# Patient Record
Sex: MALE | Race: WHITE | Employment: STUDENT | URBAN - METROPOLITAN AREA
[De-identification: names, ages, dates, MRNs, and addresses within clinical notes are randomized per-mention and may not be internally consistent; named-entity substitution may affect disease eponyms.]

---

## 2017-01-11 ENCOUNTER — TELEPHONE (OUTPATIENT)
Dept: CARDIOLOGY CLINIC | Age: 27
End: 2017-01-11

## 2017-04-20 ENCOUNTER — OFFICE VISIT (OUTPATIENT)
Dept: INTERNAL MEDICINE CLINIC | Age: 27
End: 2017-04-20

## 2017-04-20 VITALS
HEART RATE: 100 BPM | SYSTOLIC BLOOD PRESSURE: 144 MMHG | RESPIRATION RATE: 16 BRPM | DIASTOLIC BLOOD PRESSURE: 94 MMHG | OXYGEN SATURATION: 97 % | HEIGHT: 72 IN | TEMPERATURE: 99.5 F | BODY MASS INDEX: 25.14 KG/M2 | WEIGHT: 185.6 LBS

## 2017-04-20 DIAGNOSIS — J11.1 INFLUENZA: Primary | ICD-10-CM

## 2017-04-20 RX ORDER — OSELTAMIVIR PHOSPHATE 75 MG/1
75 CAPSULE ORAL 2 TIMES DAILY
Qty: 10 CAP | Refills: 0 | Status: SHIPPED | OUTPATIENT
Start: 2017-04-20 | End: 2017-04-25

## 2017-04-20 NOTE — MR AVS SNAPSHOT
Visit Information Date & Time Provider Department Dept. Phone Encounter #  
 4/20/2017  3:15 PM Dimas Hernandez MD [address] [phone] [id number] Follow-up Instructions Return if symptoms worsen or fail to improve. Upcoming Health Maintenance Date Due DTaP/Tdap/Td series (2 - Td) 10/16/2026 Allergies as of 4/20/2017  Review Complete On: 4/20/2017 By: Britt Lawson MD  
  
 Severity Noted Reaction Type Reactions Sulfa (Sulfonamide Antibiotics)  10/21/2016    Swelling Current Immunizations  Reviewed on 11/28/2016 Name Date  
 TB Skin Test (PPD) Intradermal 11/28/2016 11:59 AM  
  
 Not reviewed this visit You Were Diagnosed With   
  
 Codes Comments Influenza    -  Primary ICD-10-CM: J11.1 ICD-9-CM: 487. 1 Vitals BP Pulse Temp Resp Height(growth percentile) Weight(growth percentile) (!) 144/94 (BP 1 Location: Right arm, BP Patient Position: Sitting) (!) 115 99.5 °F (37.5 °C) (Oral) 16 6' (1.829 m) 185 lb 9.6 oz (84.2 kg) SpO2 BMI Smoking Status 97% 25.17 kg/m2 Never Smoker Vitals History BMI and BSA Data Body Mass Index Body Surface Area  
 25.17 kg/m 2 2.07 m 2 Preferred Pharmacy Pharmacy Name Phone [facility] [address] [phone] Your Updated Medication List  
  
   
This list is accurate as of: 4/20/17  3:50 PM.  Always use your most recent med list.  
  
  
  
  
 finasteride 1 mg tablet Commonly known as:  Osmin To Take 1 Tab by mouth daily. hydroCHLOROthiazide 12.5 mg tablet Commonly known as:  HYDRODIURIL Take 1 Tab by mouth daily. oseltamivir 75 mg capsule Commonly known as:  TAMIFLU Take 1 Cap by mouth two (2) times a day for 5 days. Prescriptions Sent to Pharmacy  Refills  
 oseltamivir (TAMIFLU) 75 mg capsule 0  
 Sig: Take 1 Cap by mouth two (2) times a day for 5 days. Class: Normal  
 [facility] [place] [address] #: [phone] Route: Oral  
  
Follow-up Instructions Return if symptoms worsen or fail to improve. Patient Instructions 1) follow-up as needed or sooner if worsening symptoms. 2) keep hydrated with fluids 3) take tylenol for fever no more than 4000mg a day Influenza (Flu): Care Instructions Your Care Instructions Influenza (flu) is an infection in the lungs and breathing passages. It is caused by the influenza virus. There are different strains, or types, of the flu virus from year to year. Unlike the common cold, the flu comes on suddenly and the symptoms, such as a cough, congestion, fever, chills, fatigue, aches, and pains, are more severe. These symptoms may last up to 10 days. Although the flu can make you feel very sick, it usually doesn't cause serious health problems. Home treatment is usually all you need for flu symptoms. But your doctor may prescribe antiviral medicine to prevent other health problems, such as pneumonia, from developing. Older people and those who have a long-term health condition, such as lung disease, are most at risk for having pneumonia or other health problems. Follow-up care is a key part of your treatment and safety. Be sure to make and go to all appointments, and call your doctor if you are having problems. Its also a good idea to know your test results and keep a list of the medicines you take. How can you care for yourself at home? · Get plenty of rest. 
· Drink plenty of fluids, enough so that your urine is light yellow or clear like water. If you have kidney, heart, or liver disease and have to limit fluids, talk with your doctor before you increase the amount of fluids you drink.  
· Take an over-the-counter pain medicine if needed, such as acetaminophen (Tylenol), ibuprofen (Advil, Motrin), or naproxen (Aleve), to relieve fever, headache, and muscle aches. Read and follow all instructions on the label. No one younger than 20 should take aspirin. It has been linked to Reye syndrome, a serious illness. · Do not smoke. Smoking can make the flu worse. If you need help quitting, talk to your doctor about stop-smoking programs and medicines. These can increase your chances of quitting for good. · Breathe moist air from a hot shower or from a sink filled with hot water to help clear a stuffy nose. · Before you use cough and cold medicines, check the label. These medicines may not be safe for young children or for people with certain health problems. · If the skin around your nose and lips becomes sore, put some petroleum jelly on the area. · To ease coughing: ¨ Drink fluids to soothe a scratchy throat. ¨ Suck on cough drops or plain hard candy. ¨ Take an over-the-counter cough medicine that contains dextromethorphan to help you get some sleep. Read and follow all instructions on the label. ¨ Raise your head at night with an extra pillow. This may help you rest if coughing keeps you awake. · Take any prescribed medicine exactly as directed. Call your doctor if you think you are having a problem with your medicine. To avoid spreading the flu · Wash your hands regularly, and keep your hands away from your face. · Stay home from school, work, and other public places until you are feeling better and your fever has been gone for at least 24 hours. The fever needs to have gone away on its own without the help of medicine. · Ask people living with you to talk to their doctors about preventing the flu. They may get antiviral medicine to keep from getting the flu from you. · To prevent the flu in the future, get a flu vaccine every fall. Encourage people living with you to get the vaccine. · Cover your mouth when you cough or sneeze. When should you call for help? Call 911 anytime you think you may need emergency care. For example, call if: 
· You have severe trouble breathing. Call your doctor now or seek immediate medical care if: 
· You have new or worse trouble breathing. · You seem to be getting much sicker. · You feel very sleepy or confused. · You have a new or higher fever. · You get a new rash. Watch closely for changes in your health, and be sure to contact your doctor if: 
· You begin to get better and then get worse. · You are not getting better after 1 week. Where can you learn more? Go to http://julio c-yoanna.info/. Enter E284 in the search box to learn more about \"Influenza (Flu): Care Instructions. \" Current as of: May 23, 2016 Content Version: 11.2 © 7155-5800 [employer]. Care instructions adapted under license by [employer] (which disclaims liability or warranty for this information). If you have questions about a medical condition or this instruction, always ask your healthcare professional. [address] any warranty or liability for your use of this information. Introducing [facility]! [employer] introduces [employer] patient portal. Now you can access parts of your medical record, email your doctor's office, and request medication refills online. 1. In your internet browser, go to [email or web address] 2. Click on the First Time User? Click Here link in the Sign In box. You will see the New Member Sign Up page. 3. Enter your [employer] Access Code exactly as it appears below. You will not need to use this code after youve completed the sign-up process. If you do not sign up before the expiration date, you must request a new code. · [employer] Access Code: [email or web address] Expires: 7/19/2017  3:50 PM 
 
4. Enter the last four digits of your Social Security Number (xxxx) and Date of Birth (mm/dd/yyyy) as indicated and click Submit.  You will be taken to the next sign-up page. 5. Create a shopp ID. This will be your shopp login ID and cannot be changed, so think of one that is secure and easy to remember. 6. Create a shopp password. You can change your password at any time. 7. Enter your Password Reset Question and Answer. This can be used at a later time if you forget your password. 8. Enter your e-mail address. You will receive e-mail notification when new information is available in [address]. 9. Click Sign Up. You can now view and download portions of your medical record. 10. Click the Download Summary menu link to download a portable copy of your medical information. If you have questions, please visit the Frequently Asked Questions section of the shopp website. Remember, shopp is NOT to be used for urgent needs. For medical emergencies, dial 911. Now available from your iPhone and Android! Please provide this summary of care documentation to your next provider. Your primary care clinician is listed as Ortiz Moseley. If you have any questions after today's visit, please call [phone].

## 2017-04-20 NOTE — PATIENT INSTRUCTIONS
1) follow-up as needed or sooner if worsening symptoms. 2) keep hydrated with fluids     3) take tylenol for fever no more than 4000mg a day         Influenza (Flu): Care Instructions  Your Care Instructions  Influenza (flu) is an infection in the lungs and breathing passages. It is caused by the influenza virus. There are different strains, or types, of the flu virus from year to year. Unlike the common cold, the flu comes on suddenly and the symptoms, such as a cough, congestion, fever, chills, fatigue, aches, and pains, are more severe. These symptoms may last up to 10 days. Although the flu can make you feel very sick, it usually doesn't cause serious health problems. Home treatment is usually all you need for flu symptoms. But your doctor may prescribe antiviral medicine to prevent other health problems, such as pneumonia, from developing. Older people and those who have a long-term health condition, such as lung disease, are most at risk for having pneumonia or other health problems. Follow-up care is a key part of your treatment and safety. Be sure to make and go to all appointments, and call your doctor if you are having problems. Its also a good idea to know your test results and keep a list of the medicines you take. How can you care for yourself at home? · Get plenty of rest.  · Drink plenty of fluids, enough so that your urine is light yellow or clear like water. If you have kidney, heart, or liver disease and have to limit fluids, talk with your doctor before you increase the amount of fluids you drink. · Take an over-the-counter pain medicine if needed, such as acetaminophen (Tylenol), ibuprofen (Advil, Motrin), or naproxen (Aleve), to relieve fever, headache, and muscle aches. Read and follow all instructions on the label. No one younger than 20 should take aspirin. It has been linked to Reye syndrome, a serious illness. · Do not smoke. Smoking can make the flu worse.  If you need help quitting, talk to your doctor about stop-smoking programs and medicines. These can increase your chances of quitting for good. · Breathe moist air from a hot shower or from a sink filled with hot water to help clear a stuffy nose. · Before you use cough and cold medicines, check the label. These medicines may not be safe for young children or for people with certain health problems. · If the skin around your nose and lips becomes sore, put some petroleum jelly on the area. · To ease coughing:  ¨ Drink fluids to soothe a scratchy throat. ¨ Suck on cough drops or plain hard candy. ¨ Take an over-the-counter cough medicine that contains dextromethorphan to help you get some sleep. Read and follow all instructions on the label. ¨ Raise your head at night with an extra pillow. This may help you rest if coughing keeps you awake. · Take any prescribed medicine exactly as directed. Call your doctor if you think you are having a problem with your medicine. To avoid spreading the flu  · Wash your hands regularly, and keep your hands away from your face. · Stay home from school, work, and other public places until you are feeling better and your fever has been gone for at least 24 hours. The fever needs to have gone away on its own without the help of medicine. · Ask people living with you to talk to their doctors about preventing the flu. They may get antiviral medicine to keep from getting the flu from you. · To prevent the flu in the future, get a flu vaccine every fall. Encourage people living with you to get the vaccine. · Cover your mouth when you cough or sneeze. When should you call for help? Call 911 anytime you think you may need emergency care. For example, call if:  · You have severe trouble breathing. Call your doctor now or seek immediate medical care if:  · You have new or worse trouble breathing. · You seem to be getting much sicker. · You feel very sleepy or confused.   · You have a new or higher fever. · You get a new rash. Watch closely for changes in your health, and be sure to contact your doctor if:  · You begin to get better and then get worse. · You are not getting better after 1 week. Where can you learn more? Go to http://julio c-yoanna.info/. Enter T300 in the search box to learn more about \"Influenza (Flu): Care Instructions. \"  Current as of: May 23, 2016  Content Version: 11.2  © 4538-2362 [employer]. Care instructions adapted under license by [employer] (which disclaims liability or warranty for this information). If you have questions about a medical condition or this instruction, always ask your healthcare professional. [address] any warranty or liability for your use of this information.

## 2017-04-20 NOTE — PROGRESS NOTES
ROOM # 2    Esperanza Mensah presents today for   Chief Complaint   Patient presents with    Fever     t max 102 @ 0600    Chills     x2 days    Generalized Body Aches     x2 days       Esperanza Mensah preferred language for health care discussion is english/other. Is someone accompanying this pt? no    Is the patient using any DME equipment during OV? no    Depression Screening:  PHQ 2 / 9, over the last two weeks 4/20/2017 12/1/2016 10/26/2016   Little interest or pleasure in doing things Not at all Not at all Not at all   Feeling down, depressed or hopeless Not at all Not at all Not at all   Total Score PHQ 2 0 0 0       Learning Assessment:  Learning Assessment 10/26/2016   PRIMARY LEARNER Patient   HIGHEST LEVEL OF EDUCATION - PRIMARY LEARNER  > 4 YEARS OF COLLEGE   BARRIERS PRIMARY LEARNER NONE   CO-LEARNER CAREGIVER No   PRIMARY LANGUAGE ENGLISH   LEARNER PREFERENCE PRIMARY READING   ANSWERED BY patient   RELATIONSHIP SELF       Abuse Screening:  No flowsheet data found. Fall Risk  No flowsheet data found. Health Maintenance reviewed and discussed per provider. Yes      Advance Directive:  1. Do you have an advance directive in place? Patient Reply: no    2. If not, would you like material regarding how to put one in place? Patient Reply: no    Coordination of Care:  1. Have you been to the ER, urgent care clinic since your last visit? Hospitalized since your last visit? no    2. Have you seen or consulted any other health care providers outside of the [address] [place] [address] since your last visit? Include any pap smears or colon screening.  Cardiology for high bp

## 2017-04-20 NOTE — LETTER
NOTIFICATION RETURN TO WORK / SCHOOL 
 
4/20/2017 3:49 PM 
 
Mr. Lyndol Osgood [address] [facility] [address] [id number] To Whom It May Concern: 
 
Lyndol Osgood is currently under the care of Lana Carrera. He will return to work on 4/22/17. If there are questions or concerns please have the patient contact our office. Sincerely, Gifty Barth MD

## 2017-07-11 DIAGNOSIS — I10 BENIGN HYPERTENSION WITHOUT CHF: ICD-10-CM

## 2017-07-11 RX ORDER — HYDROCHLOROTHIAZIDE 12.5 MG/1
12.5 TABLET ORAL DAILY
Qty: 30 TAB | Refills: 3 | Status: SHIPPED | OUTPATIENT
Start: 2017-07-11 | End: 2018-02-12

## 2017-07-11 NOTE — TELEPHONE ENCOUNTER
Requested Prescriptions     Pending Prescriptions Disp Refills    hydroCHLOROthiazide (HYDRODIURIL) 12.5 mg tablet 30 Tab 3     Sig: Take 1 Tab by mouth daily.

## 2017-09-19 ENCOUNTER — OFFICE VISIT (OUTPATIENT)
Dept: ORTHOPEDIC SURGERY | Age: 27
End: 2017-09-19

## 2017-09-19 VITALS
DIASTOLIC BLOOD PRESSURE: 105 MMHG | WEIGHT: 188 LBS | HEART RATE: 104 BPM | SYSTOLIC BLOOD PRESSURE: 145 MMHG | HEIGHT: 72 IN | BODY MASS INDEX: 25.47 KG/M2 | TEMPERATURE: 96.8 F | OXYGEN SATURATION: 100 % | RESPIRATION RATE: 18 BRPM

## 2017-09-19 DIAGNOSIS — G56.21 ULNAR NEURITIS, RIGHT: Primary | ICD-10-CM

## 2017-09-19 DIAGNOSIS — M25.521 RIGHT ELBOW PAIN: ICD-10-CM

## 2017-09-19 DIAGNOSIS — S46.911A ELBOW STRAIN, RIGHT, INITIAL ENCOUNTER: ICD-10-CM

## 2017-09-19 RX ORDER — MINOXIDIL 50 MG/G
AEROSOL, FOAM TOPICAL
COMMUNITY

## 2017-09-19 NOTE — MR AVS SNAPSHOT
Visit Information Date & Time Provider Department Dept. Phone Encounter #  
 9/19/2017  1:00 PM Berkley Ladd, [address] [facility] [phone] [id number] Upcoming Health Maintenance Date Due INFLUENZA AGE 9 TO ADULT 8/1/2017 DTaP/Tdap/Td series (2 - Td) 10/16/2026 Allergies as of 9/19/2017  Review Complete On: 9/19/2017 By: Maddy Veras Severity Noted Reaction Type Reactions Sulfa (Sulfonamide Antibiotics)  10/21/2016    Swelling Current Immunizations  Reviewed on 11/28/2016 Name Date  
 TB Skin Test (PPD) Intradermal 11/28/2016 11:59 AM  
  
 Not reviewed this visit You Were Diagnosed With   
  
 Codes Comments Right elbow pain    -  Primary ICD-10-CM: A76.420 ICD-9-CM: 719.42 Vitals BP Pulse Temp Resp Height(growth percentile) Weight(growth percentile) (!) 145/105 (!) 104 96.8 °F (36 °C) (Oral) 18 6' (1.829 m) 188 lb (85.3 kg) SpO2 BMI Smoking Status 100% 25.5 kg/m2 Never Smoker BMI and BSA Data Body Mass Index Body Surface Area 25.5 kg/m 2 2.08 m 2 Preferred Pharmacy Pharmacy Name Phone [facility] [address] [place] [address] [address] [phone] Your Updated Medication List  
  
   
This list is accurate as of: 9/19/17  1:24 PM.  Always use your most recent med list.  
  
  
  
  
 finasteride 1 mg tablet Commonly known as:  Tatiana Romp Take 1 Tab by mouth daily. hydroCHLOROthiazide 12.5 mg tablet Commonly known as:  HYDRODIURIL Take 1 Tab by mouth daily. Minoxidil 5 % Foam  
by Apply Externally route. We Performed the Following AMB POC XRAY, ELBOW; COMPLETE, 3+ VIE [64512 CPT(R)] Patient Instructions Please follow up as needed. You are advised to contact us if your condition worsens. Elbow: Exercises Your Care Instructions Here are some examples of exercises for elbows. Start each exercise slowly. Ease off the exercise if you start to have pain. Your doctor or physical therapist will tell you when you can start these exercises and which ones will work best for you. How to do the exercises Wrist flexor stretch 1. Extend your arm in front of you with your palm up. 2. Bend your wrist, pointing your hand toward the floor. 3. With your other hand, gently bend your wrist farther until you feel a mild to moderate stretch in your forearm. 4. Hold for at least 15 to 30 seconds. Repeat 2 to 4 times. Wrist extensor stretch Repeat steps 1 to 4 of the stretch above but begin with your extended hand palm down. Ball or sock squeeze 1. Hold a tennis ball (or a rolled-up sock) in your hand. 2. Make a fist around the ball (or sock) and squeeze. 3. Hold for about 6 seconds, and then relax for up to 10 seconds. 4. Repeat 8 to 12 times. 5. Switch the ball (or sock) to your other hand and do 8 to 12 times. Wrist deviation 1. Sit so that your arm is supported but your hand hangs off the edge of a flat surface, such as a table. 2. Hold your hand out like you are shaking hands with someone. 3. Move your hand up and down. 4. Repeat this motion 8 to 12 times. 5. Switch arms. 6. Try to do this exercise twice with each hand. Wrist curls 1. Place your forearm on a table with your hand hanging over the edge of the table, palm up. 2. Place a 1- to 2-pound weight in your hand. This may be a dumbbell, a can of food, or a filled water bottle. 3. Slowly raise and lower the weight while keeping your forearm on the table and your palm facing up. 4. Repeat this motion 8 to 12 times. 5. Switch arms, and do steps 1 through 4. 
6. Repeat with your hand facing down toward the floor. Switch arms. Biceps curls 1. Sit leaning forward with your legs slightly spread and your left hand on your left thigh. 2. Place your right elbow on your right thigh, and hold the weight with your forearm horizontal. 
3. Slowly curl the weight up and toward your chest. 
4. Repeat this motion 8 to 12 times. 5. Switch arms, and do steps 1 through 4. Follow-up care is a key part of your treatment and safety. Be sure to make and go to all appointments, and call your doctor if you are having problems. It's also a good idea to know your test results and keep a list of the medicines you take. Where can you learn more? Go to [email or web address] c-yoanna.info/. Enter M279 in the search box to learn more about \"Elbow: Exercises. \" Current as of: March 21, 2017 Content Version: 11.3 © 7921-1439 [employer]. Care instructions adapted under license by [employer] (which disclaims liability or warranty for this information). If you have questions about a medical condition or this instruction, always ask your healthcare professional. [place] [address] any warranty or liability for your use of this information. Little Leaguer's Elbow (Medial Apophysitis): Exercises Your Care Instructions Here are some examples of typical rehabilitation exercises for your condition. Start each exercise slowly. Ease off the exercise if you start to have pain. Your doctor or physical therapist will tell you when you can start these exercises and which ones will work best for you. How to do the exercises Wrist flexor stretch 5. Extend your affected arm in front of you. Your palm should face away from your body. 6. Bend back your wrist on your affected arm, pointing your hand up toward the ceiling. 7. With your other hand, gently bend your wrist farther until you feel a mild to moderate stretch in your forearm. 8. Hold for at least 15 to 30 seconds. 9. Repeat 2 to 4 times. 10. Repeat steps 1 through 5.  But this time extend your affected arm in front of you with your palm facing up. Then bend back your wrist, pointing your hand toward the floor. Resisted wrist extension Note: For the following exercises, you will need elastic exercise material, such as surgical tubing or Thera-Band. 6. Sit leaning forward with your legs slightly spread. Then place your affected forearm on your thigh with your hand and wrist in front of your knee. 7. Grasp one end of an exercise band with your palm down. Step on the other end. 
8. Slowly bend your wrist upward for a count of 2. Then lower your wrist slowly to a count of 5. 
9. Repeat 8 to 12 times. Resisted wrist flexion 7. Sit leaning forward with your legs slightly spread. Then place your affected forearm on your thigh with your hand and wrist in front of your knee. 8. Grasp one end of an exercise band with your palm up. Step on the other end. 
9. Slowly bend your wrist upward for a count of 2. Then lower your wrist slowly to a count of 5. 
10. Repeat 8 to 12 times. Resisted radial deviation 7. Sit leaning forward with your legs slightly spread. Then place your affected forearm on your thigh with your hand and wrist in front of your knee. 8. Grasp one end of an exercise band with your hand facing toward your other thigh. Step on the other end of the band. 9. Slowly bend your wrist upward for a count of 2. Then lower your wrist slowly to a count of 5. 
10. Repeat 8 to 12 times. Resisted ulnar deviation 6. Sit leaning forward with your legs slightly spread. Then place your affected forearm on your thigh with your hand and wrist by the inside of your knee. 7. Grasp one end of an exercise band with your palm down. Step on the other end with the foot opposite the hand holding the band. 8. Slowly bend your wrist outward and toward your knee for a count of 2. Then slowly move your wrist back to the starting position to a count of 5. 
9. Repeat 8 to 12 times. Resisted forearm supination 1. Sit leaning forward with your legs slightly spread. Then place your affected forearm on your thigh with your hand and wrist in front of your knee. 2. Grasp one end of an exercise band with your palm down. Step on the other end. 3. Keeping your wrist straight, roll your palm outward and away from the inside of your thigh for a count of 2. Then slowly move your wrist back to the starting position to a count of 5. 
4. Repeat 8 to 12 times. Resisted forearm pronation 1. Sit leaning forward with your legs slightly spread. Then place your affected forearm on your thigh with your hand and wrist in front of your knee. 2. Grasp one end of an exercise band with your palm up. Step on the other end. 3. Keeping your wrist straight, roll your palm inward toward your thigh for a count of 2. Then slowly move your wrist back to the starting position to a count of 5. 
4. Repeat 8 to 12 times. Follow-up care is a key part of your treatment and safety. Be sure to make and go to all appointments, and call your doctor if you are having problems. It's also a good idea to know your test results and keep a list of the medicines you take. Where can you learn more? Go to http://julio c-yoanna.info/. Enter K799 in the search box to learn more about \"Little Leaguer's Elbow (Medial Apophysitis): Exercises. \" Current as of: March 21, 2017 Content Version: 11.3 © 7132-2890 [employer]. Care instructions adapted under license by [employer] (which disclaims liability or warranty for this information). If you have questions about a medical condition or this instruction, always ask your healthcare professional. [place] [address] any warranty or liability for your use of this information. Biceps Tendinitis: Exercises Your Care Instructions Here are some examples of typical rehabilitation exercises for your condition. Start each exercise slowly. Ease off the exercise if you start to have pain. Your doctor or physical therapist will tell you when you can start these exercises and which ones will work best for you. How to do the exercises Biceps stretch 11. Stand and hold your affected arm out to the side, with your hand at about hip level. 12. Gently bend your wrist back so that your fingers point down toward the floor. 13. You may also do this next to a wall and rest your fingers on the wall. 14. For more of a stretch, bend your head to the opposite side of your affected arm. 15. Hold for 15 to 30 seconds. 16. Repeat 2 to 4 times. Resisted supination Note: For this and the following exercises, you will need elastic exercise material, such as surgical tubing or Thera-Band. 10. Sit leaning forward with your legs slightly spread. Then place your forearm on your thigh with your hand and affected wrist in front of your knee. 11. Grasp one end of an exercise band with your palm down, and step on the other end. 12. Keeping your wrist straight, roll your palm outward and away from your thigh for a count of 2, then slowly move your wrist back to the starting position to a count of 5. 
13. Repeat 8 to 12 times. Resisted elbow flexion 11. Using your affected arm, hold one end of an elastic band in your hand. 12. Place the other end of the band under your foot on the same side of your body as your affected arm. 13. Slowly bend your elbow and bring your hand toward your shoulder. Your palm and the underside of your wrist should be facing up as you pull the band toward your shoulder. Count to 2 as you pull up. 14. Relax and slowly return to your starting position. Count to 5 as you return to the start. 15. Repeat 8 to 12 times. Resisted elbow flexion at shoulder level 11. Tie the ends of an exercise band together to form a loop.  Attach one end of the loop to a secure object or shut a door on it to hold it in place. (Or you can have someone hold one end of the loop to provide resistance.) The band should be at shoulder level. 12. Stand facing where you have tied or fastened the band. 13. Start with your affected arm held out straight, holding the band in your hand. 14. Slowly bend your elbow to 90 degrees, bringing your hand toward your forehead. Count to 2 as you pull the band toward your head. 15. Relax and slowly return to your starting position. Count to 5 as you return to the start. 16. Repeat 8 to 12 times. Follow-up care is a key part of your treatment and safety. Be sure to make and go to all appointments, and call your doctor if you are having problems. It's also a good idea to know your test results and keep a list of the medicines you take. Where can you learn more? Go to http://julio c-yoanna.info/. Enter K027 in the search box to learn more about \"Biceps Tendinitis: Exercises. \" Current as of: March 21, 2017 Content Version: 11.3 © 5499-6877 [employer]. Care instructions adapted under license by [employer] (which disclaims liability or warranty for this information). If you have questions about a medical condition or this instruction, always ask your healthcare professional. [address] any warranty or liability for your use of this information. Elbow Bursitis: Exercises Your Care Instructions Here are some examples of typical rehabilitation exercises for your condition. Start each exercise slowly. Ease off the exercise if you start to have pain. Your doctor or physical therapist will tell you when you can start these exercises and which ones will work best for you. How to do the exercises Elbow flexion stretch 17. Lift the arm that bothers you, and bend the elbow. Your palm should face toward you. 18. With your other hand, gently push on the back of your affected forearm. Press your hand toward your shoulder until you feel a stretch in the back of your upper arm. 19. Hold for at least 15 to 30 seconds. 20. Repeat 2 to 4 times. Elbow extension stretch 14. Extend your affected arm in front of you with your palm facing away from you. [address] back your wrist, pointing your hand up toward the ceiling. 16. With your other hand, gently bend your wrist farther until you feel a mild to moderate stretch in your forearm. 17. Hold for at least 15 to 30 seconds. 18. Repeat 2 to 4 times. 19. Repeat steps 1 through 5. But this time extend your affected arm in front of you with your palm facing up. Then bend back your wrist, pointing your hand toward the floor. Pronation and supination stretch 16. Keep your affected elbow at your side, bent at about 90 degrees. Grasp a pen, pencil, or stick, and wrap your hand around it. If you don't have something to hold on to, make a fist instead. 17. Slowly turn your forearm as far as you can back and forth in each direction. Your hand should face up and then down. 18. Hold each position for about 6 seconds. 19. Relax for up to 10 seconds between repetitions. 20. Repeat 8 to 12 times. Hand flips 17. While seated, place your affected forearm on your thigh. Your palm should face down. 18. Flip your hand over so the back of your hand rests on your thigh and your palm is up. Alternate between palm up and palm down while keeping your forearm on your thigh. 19. Repeat 8 to 12 times. Follow-up care is a key part of your treatment and safety. Be sure to make and go to all appointments, and call your doctor if you are having problems. It's also a good idea to know your test results and keep a list of the medicines you take. Where can you learn more? Go to [email or web address] c-yoanna.info/.  
Enter V461 in the search box to learn more about \"Elbow Bursitis: Exercises. \" Current as of: March 21, 2017 Content Version: 11.3 © 7491-0462 [employer]. Care instructions adapted under license by [employer] (which disclaims liability or warranty for this information). If you have questions about a medical condition or this instruction, always ask your healthcare professional. [address] any warranty or liability for your use of this information. Golfer's Elbow: Exercises Your Care Instructions Here are some examples of typical rehabilitation exercises for your condition. Start each exercise slowly. Ease off the exercise if you start to have pain. Your doctor or physical therapist will tell you when you can start these exercises and which ones will work best for you. How to do the exercises Wrist extensor stretch 21. Extend your affected arm in front of you and make a fist with your palm facing down. [address] your wrist so that your fist points toward the floor. 23. With your other hand, gently bend your wrist farther until you feel a mild to moderate stretch in your forearm. 24. Hold for at least 15 to 30 seconds. 25. Repeat 2 to 4 times. 26. Repeat steps 1 through 5 with your fingers pointing toward the floor. Forearm extensor stretch 
 
20. Place your affected elbow down at your side, bent at about 90 degrees. Then make a fist with your palm facing down. 21. Keeping your wrist bent, slowly straighten your elbow so your arm is down at your side. Then twist your fist out so your palm is facing out to the side and you feel a stretch. 22. Hold for at least 15 to 30 seconds. 23. Repeat 2 to 4 times. Wrist flexor stretch 21. Extend your affected arm in front of you with your palm facing away from your body. [address] back your wrist, pointing your hand up toward the ceiling. 23. With your other hand, gently bend your wrist farther until you feel a mild to moderate stretch in your forearm. 24. Hold for at least 15 to 30 seconds. 25. Repeat 2 to 4 times. 26. Repeat steps 1 through 5, but this time extend your affected arm in front of you with your palm facing up. Then bend back your wrist, pointing your hand toward the floor. Wrist curls 20. Place your forearm on a table with your hand hanging over the edge of the table, palm up. 21. Place a 1- to 2-pound weight in your hand. This may be a dumbbell, a can of food, or a filled water bottle. 22. Slowly raise and lower the weight while keeping your forearm on the table and your palm facing up. 23. Repeat this motion 8 to 12 times. 24. Switch arms, and do steps 1 through 4. 
25. Repeat with your hand facing down toward the floor. Switch arms. Resisted wrist extension 10. Sit leaning forward with your legs slightly spread. Then place your affected forearm on your thigh with your hand and wrist in front of your knee. 11. Grasp one end of an exercise band with your palm down, and step on the other end. 12. Slowly bend your wrist upward for a count of 2, then lower your wrist slowly to a count of 5. 
13. Repeat 8 to 12 times. Resisted wrist flexion 5. Sit leaning forward with your legs slightly spread. Then place your affected forearm on your thigh with your hand and wrist in front of your knee. 6. Grasp one end of an exercise band with your palm up, and step on the other end. 
7. Slowly bend your wrist upward for a count of 2, then lower your wrist slowly to a count of 5. 
8. Repeat 8 to 12 times. Neck stretch to the side 5. This stretch works best if you keep your shoulder down as you lean away from it. To help you remember to do this, start by relaxing your shoulders and lightly holding on to your thighs or your chair. 6. Tilt your head away from your affected elbow and toward your opposite shoulder. For example, if your right elbow is sore, keep your right shoulder down as you lean your head toward your left shoulder. 7. Hold for 15 to 30 seconds. Let the weight of your head stretch your muscles. 8. If you would like a little added stretch, use your hand to gently and steadily pull your head toward your shoulder. For example, if your right elbow is sore, use your left hand to gently pull your head toward your left shoulder. 9. Repeat 2 to 4 times. Resisted forearm pronation 1. Sit leaning forward with your legs slightly spread. Then place your affected forearm on your thigh with your hand and wrist in front of your knee. 2. Grasp one end of an exercise band with your palm up, and step on the other end. 3. Keeping your wrist straight, roll your palm inward toward your thigh for a count of 2, then slowly move your wrist back to the starting position to a count of 5. 
4. Repeat 8 to 12 times. Resisted supination 1. Sit leaning forward with your legs slightly spread. Then place your affected forearm on your thigh with your hand and wrist in front of your knee. 2. Grasp one end of an exercise band with your palm down, and step on the other end. 3. Keeping your wrist straight, roll your palm outward and away from your thigh for a count of 2, then slowly move your wrist back to the starting position to a count of 5. 
4. Repeat 8 to 12 times. Follow-up care is a key part of your treatment and safety. Be sure to make and go to all appointments, and call your doctor if you are having problems. It's also a good idea to know your test results and keep a list of the medicines you take. Where can you learn more? Go to http://julio c-yoanna.info/. Enter [phone] in the search box to learn more about \"Golfer's Elbow: Exercises. \" Current as of: March 21, 2017 Content Version: 11.3 © 7444-2631 [employer], [employer]. Care instructions adapted under license by [employer] (which disclaims liability or warranty for this information).  If you have questions about a medical condition or this instruction, always ask your healthcare professional. [place] [address] any warranty or liability for your use of this information. Tennis Elbow: Exercises Your Care Instructions Here are some examples of typical rehabilitation exercises for your condition. Start each exercise slowly. Ease off the exercise if you start to have pain. Your doctor or physical therapist will tell you when you can start these exercises and which ones will work best for you. How to do the exercises Wrist flexor stretch 27. Extend your arm in front of you with your palm up. 29. Bend your wrist, pointing your hand toward the floor. 29. With your other hand, gently bend your wrist farther until you feel a mild to moderate stretch in your forearm. 30. Hold for at least 15 to 30 seconds. Repeat 2 to 4 times. Wrist extensor stretch Repeat steps 1 to 4 of the stretch above but begin with your extended hand palm down. Ball or sock squeeze 24. Hold a tennis ball (or a rolled-up sock) in your hand. 25. Make a fist around the ball (or sock) and squeeze. 26. Hold for about 6 seconds, and then relax for up to 10 seconds. 27. Repeat 8 to 12 times. 28. Switch the ball (or sock) to your other hand and do 8 to 12 times. Wrist deviation 27. Sit so that your arm is supported but your hand hangs off the edge of a flat surface, such as a table. 28. Hold your hand out like you are shaking hands with someone. 29. Move your hand up and down. 30. Repeat this motion 8 to 12 times. 31. Switch arms. 32. Try to do this exercise twice with each hand. Wrist curls 26. Place your forearm on a table with your hand hanging over the edge of the table, palm up. 32. Place a 1- to 2-pound weight in your hand. This may be a dumbbell, a can of food, or a filled water bottle. 28. Slowly raise and lower the weight while keeping your forearm on the table and your palm facing up. 29. Repeat this motion 8 to 12 times. 30. Switch arms, and do steps 1 through 4. 
31. Repeat with your hand facing down toward the floor. Switch arms. Biceps curls 14. Sit leaning forward with your legs slightly spread and your left hand on your left thigh. 15. Place your right elbow on your right thigh, and hold the weight with your forearm horizontal. 
16. Slowly curl the weight up and toward your chest. 
17. Repeat this motion 8 to 12 times. 18. Switch arms, and do steps 1 through 4. Follow-up care is a key part of your treatment and safety. Be sure to make and go to all appointments, and call your doctor if you are having problems. It's also a good idea to know your test results and keep a list of the medicines you take. Where can you learn more? Go to http://julio c-yoanna.info/. Enter S934 in the search box to learn more about \"Tennis Elbow: Exercises. \" Current as of: March 21, 2017 Content Version: 11.3 © 2033-9323 [employer]. Care instructions adapted under license by [employer] (which disclaims liability or warranty for this information). If you have questions about a medical condition or this instruction, always ask your healthcare professional. [address] any warranty or liability for your use of this information. Introducing [facility]! Monica Moore introduces Upplication patient portal. Now you can access parts of your medical record, email your doctor's office, and request medication refills online. 1. In your internet browser, go to [email or web address] 2. Click on the First Time User? Click Here link in the Sign In box. You will see the New Member Sign Up page. 3. Enter your Upplication Access Code exactly as it appears below. You will not need to use this code after youve completed the sign-up process. If you do not sign up before the expiration date, you must request a new code. · [facility] Access Code: [email or web address] Expires: 12/18/2017  1:24 PM 
 
4. Enter the last four digits of your Social Security Number (xxxx) and Date of Birth (mm/dd/yyyy) as indicated and click Submit. You will be taken to the next sign-up page. 5. Create a [facility] ID. This will be your [facility] login ID and cannot be changed, so think of one that is secure and easy to remember. 6. Create a [facility] password. You can change your password at any time. 7. Enter your Password Reset Question and Answer. This can be used at a later time if you forget your password. 8. Enter your e-mail address. You will receive e-mail notification when new information is available in [address] 9. Click Sign Up. You can now view and download portions of your medical record. 10. Click the Download Summary menu link to download a portable copy of your medical information. If you have questions, please visit the Frequently Asked Questions section of the [facility] website. Remember, [facility] is NOT to be used for urgent needs. For medical emergencies, dial 911. Now available from your iPhone and Android! Please provide this summary of care documentation to your next provider. Your primary care clinician is listed as Ortiz Moseley. If you have any questions after today's visit, please call [phone].

## 2017-09-19 NOTE — PATIENT INSTRUCTIONS
Please follow up as needed. You are advised to contact us if your condition worsens. Elbow: Exercises  Your Care Instructions  Here are some examples of exercises for elbows. Start each exercise slowly. Ease off the exercise if you start to have pain. Your doctor or physical therapist will tell you when you can start these exercises and which ones will work best for you. How to do the exercises  Wrist flexor stretch    1. Extend your arm in front of you with your palm up. 2. Bend your wrist, pointing your hand toward the floor. 3. With your other hand, gently bend your wrist farther until you feel a mild to moderate stretch in your forearm. 4. Hold for at least 15 to 30 seconds. Repeat 2 to 4 times. Wrist extensor stretch    Repeat steps 1 to 4 of the stretch above but begin with your extended hand palm down. Ball or sock squeeze    1. Hold a tennis ball (or a rolled-up sock) in your hand. 2. Make a fist around the ball (or sock) and squeeze. 3. Hold for about 6 seconds, and then relax for up to 10 seconds. 4. Repeat 8 to 12 times. 5. Switch the ball (or sock) to your other hand and do 8 to 12 times. Wrist deviation    1. Sit so that your arm is supported but your hand hangs off the edge of a flat surface, such as a table. 2. Hold your hand out like you are shaking hands with someone. 3. Move your hand up and down. 4. Repeat this motion 8 to 12 times. 5. Switch arms. 6. Try to do this exercise twice with each hand. Wrist curls    1. Place your forearm on a table with your hand hanging over the edge of the table, palm up. 2. Place a 1- to 2-pound weight in your hand. This may be a dumbbell, a can of food, or a filled water bottle. 3. Slowly raise and lower the weight while keeping your forearm on the table and your palm facing up. 4. Repeat this motion 8 to 12 times. 5. Switch arms, and do steps 1 through 4.  6. Repeat with your hand facing down toward the floor. Switch arms.   Biceps curls    1. Sit leaning forward with your legs slightly spread and your left hand on your left thigh. 2. Place your right elbow on your right thigh, and hold the weight with your forearm horizontal.  3. Slowly curl the weight up and toward your chest.  4. Repeat this motion 8 to 12 times. 5. Switch arms, and do steps 1 through 4. Follow-up care is a key part of your treatment and safety. Be sure to make and go to all appointments, and call your doctor if you are having problems. It's also a good idea to know your test results and keep a list of the medicines you take. Where can you learn more? Go to http://julio c-yoanna.info/. Enter M279 in the search box to learn more about \"Elbow: Exercises. \"  Current as of: March 21, 2017  Content Version: 11.3  © 2805-5849 [employer]. Care instructions adapted under license by [employer] (which disclaims liability or warranty for this information). If you have questions about a medical condition or this instruction, always ask your healthcare professional. [address] any warranty or liability for your use of this information. Little Leaguer's Elbow (Medial Apophysitis): Exercises  Your Care Instructions  Here are some examples of typical rehabilitation exercises for your condition. Start each exercise slowly. Ease off the exercise if you start to have pain. Your doctor or physical therapist will tell you when you can start these exercises and which ones will work best for you. How to do the exercises  Wrist flexor stretch    5. Extend your affected arm in front of you. Your palm should face away from your body. 6. Bend back your wrist on your affected arm, pointing your hand up toward the ceiling. 7. With your other hand, gently bend your wrist farther until you feel a mild to moderate stretch in your forearm. 8. Hold for at least 15 to 30 seconds. 9. Repeat 2 to 4 times.   10. Repeat steps 1 through 5. But this time extend your affected arm in front of you with your palm facing up. Then bend back your wrist, pointing your hand toward the floor. Resisted wrist extension    Note: For the following exercises, you will need elastic exercise material, such as surgical tubing or Thera-Band. 6. Sit leaning forward with your legs slightly spread. Then place your affected forearm on your thigh with your hand and wrist in front of your knee. 7. Grasp one end of an exercise band with your palm down. Step on the other end.  8. Slowly bend your wrist upward for a count of 2. Then lower your wrist slowly to a count of 5.  9. Repeat 8 to 12 times. Resisted wrist flexion    7. Sit leaning forward with your legs slightly spread. Then place your affected forearm on your thigh with your hand and wrist in front of your knee. 8. Grasp one end of an exercise band with your palm up. Step on the other end.  9. Slowly bend your wrist upward for a count of 2. Then lower your wrist slowly to a count of 5.  10. Repeat 8 to 12 times. Resisted radial deviation    7. Sit leaning forward with your legs slightly spread. Then place your affected forearm on your thigh with your hand and wrist in front of your knee. 8. Grasp one end of an exercise band with your hand facing toward your other thigh. Step on the other end of the band. 9. Slowly bend your wrist upward for a count of 2. Then lower your wrist slowly to a count of 5.  10. Repeat 8 to 12 times. Resisted ulnar deviation    6. Sit leaning forward with your legs slightly spread. Then place your affected forearm on your thigh with your hand and wrist by the inside of your knee. 7. Grasp one end of an exercise band with your palm down. Step on the other end with the foot opposite the hand holding the band. 8. Slowly bend your wrist outward and toward your knee for a count of 2.  Then slowly move your wrist back to the starting position to a count of 5.  9. Repeat 8 to 12 times. Resisted forearm supination    1. Sit leaning forward with your legs slightly spread. Then place your affected forearm on your thigh with your hand and wrist in front of your knee. 2. Grasp one end of an exercise band with your palm down. Step on the other end. 3. Keeping your wrist straight, roll your palm outward and away from the inside of your thigh for a count of 2. Then slowly move your wrist back to the starting position to a count of 5.  4. Repeat 8 to 12 times. Resisted forearm pronation    1. Sit leaning forward with your legs slightly spread. Then place your affected forearm on your thigh with your hand and wrist in front of your knee. 2. Grasp one end of an exercise band with your palm up. Step on the other end. 3. Keeping your wrist straight, roll your palm inward toward your thigh for a count of 2. Then slowly move your wrist back to the starting position to a count of 5.  4. Repeat 8 to 12 times. Follow-up care is a key part of your treatment and safety. Be sure to make and go to all appointments, and call your doctor if you are having problems. It's also a good idea to know your test results and keep a list of the medicines you take. Where can you learn more? Go to http://julio c-yoanna.info/. Enter F224 in the search box to learn more about \"Little Leaguer's Elbow (Medial Apophysitis): Exercises. \"  Current as of: March 21, 2017  Content Version: 11.3  © 6506-4346 [employer]. Care instructions adapted under license by [employer] (which disclaims liability or warranty for this information). If you have questions about a medical condition or this instruction, always ask your healthcare professional. [place] [address] any warranty or liability for your use of this information. Biceps Tendinitis: Exercises  Your Care Instructions  Here are some examples of typical rehabilitation exercises for your condition.  Start each exercise slowly. Ease off the exercise if you start to have pain. Your doctor or physical therapist will tell you when you can start these exercises and which ones will work best for you. How to do the exercises  Biceps stretch    11. Stand and hold your affected arm out to the side, with your hand at about hip level. 12. Gently bend your wrist back so that your fingers point down toward the floor. 13. You may also do this next to a wall and rest your fingers on the wall. 14. For more of a stretch, bend your head to the opposite side of your affected arm. 15. Hold for 15 to 30 seconds. 16. Repeat 2 to 4 times. Resisted supination    Note: For this and the following exercises, you will need elastic exercise material, such as surgical tubing or Thera-Band. 10. Sit leaning forward with your legs slightly spread. Then place your forearm on your thigh with your hand and affected wrist in front of your knee. 11. Grasp one end of an exercise band with your palm down, and step on the other end. 12. Keeping your wrist straight, roll your palm outward and away from your thigh for a count of 2, then slowly move your wrist back to the starting position to a count of 5.  13. Repeat 8 to 12 times. Resisted elbow flexion    11. Using your affected arm, hold one end of an elastic band in your hand. 12. Place the other end of the band under your foot on the same side of your body as your affected arm. 13. Slowly bend your elbow and bring your hand toward your shoulder. Your palm and the underside of your wrist should be facing up as you pull the band toward your shoulder. Count to 2 as you pull up. 14. Relax and slowly return to your starting position. Count to 5 as you return to the start. 15. Repeat 8 to 12 times. Resisted elbow flexion at shoulder level    11. Tie the ends of an exercise band together to form a loop. Attach one end of the loop to a secure object or shut a door on it to hold it in place.  (Or you can have someone hold one end of the loop to provide resistance.) The band should be at shoulder level. 12. Stand facing where you have tied or fastened the band. 13. Start with your affected arm held out straight, holding the band in your hand. 14. Slowly bend your elbow to 90 degrees, bringing your hand toward your forehead. Count to 2 as you pull the band toward your head. 15. Relax and slowly return to your starting position. Count to 5 as you return to the start. 16. Repeat 8 to 12 times. Follow-up care is a key part of your treatment and safety. Be sure to make and go to all appointments, and call your doctor if you are having problems. It's also a good idea to know your test results and keep a list of the medicines you take. Where can you learn more? Go to http://julio c-yoanna.info/. Enter C677 in the search box to learn more about \"Biceps Tendinitis: Exercises. \"  Current as of: March 21, 2017  Content Version: 11.3  © 6056-0946 [employer]. Care instructions adapted under license by [employer] (which disclaims liability or warranty for this information). If you have questions about a medical condition or this instruction, always ask your healthcare professional. [address] any warranty or liability for your use of this information. Elbow Bursitis: Exercises  Your Care Instructions  Here are some examples of typical rehabilitation exercises for your condition. Start each exercise slowly. Ease off the exercise if you start to have pain. Your doctor or physical therapist will tell you when you can start these exercises and which ones will work best for you. How to do the exercises  Elbow flexion stretch    17. Lift the arm that bothers you, and bend the elbow. Your palm should face toward you. 18. With your other hand, gently push on the back of your affected forearm.  Press your hand toward your shoulder until you feel a stretch in the back of your upper arm. 19. Hold for at least 15 to 30 seconds. 20. Repeat 2 to 4 times. Elbow extension stretch    14. Extend your affected arm in front of you with your palm facing away from you. [address] back your wrist, pointing your hand up toward the ceiling. 16. With your other hand, gently bend your wrist farther until you feel a mild to moderate stretch in your forearm. 17. Hold for at least 15 to 30 seconds. 18. Repeat 2 to 4 times. 19. Repeat steps 1 through 5. But this time extend your affected arm in front of you with your palm facing up. Then bend back your wrist, pointing your hand toward the floor. Pronation and supination stretch    16. Keep your affected elbow at your side, bent at about 90 degrees. Grasp a pen, pencil, or stick, and wrap your hand around it. If you don't have something to hold on to, make a fist instead. 17. Slowly turn your forearm as far as you can back and forth in each direction. Your hand should face up and then down. 18. Hold each position for about 6 seconds. 19. Relax for up to 10 seconds between repetitions. 20. Repeat 8 to 12 times. Hand flips    17. While seated, place your affected forearm on your thigh. Your palm should face down. 18. Flip your hand over so the back of your hand rests on your thigh and your palm is up. Alternate between palm up and palm down while keeping your forearm on your thigh. 19. Repeat 8 to 12 times. Follow-up care is a key part of your treatment and safety. Be sure to make and go to all appointments, and call your doctor if you are having problems. It's also a good idea to know your test results and keep a list of the medicines you take. Where can you learn more? Go to http://julio c-yoanna.info/. Enter T389 in the search box to learn more about \"Elbow Bursitis: Exercises. \"  Current as of: March 21, 2017  Content Version: 11.3  © 8059-4487 [employer].  Care instructions adapted under license by [employer] (which disclaims liability or warranty for this information). If you have questions about a medical condition or this instruction, always ask your healthcare professional. [address] any warranty or liability for your use of this information. Goldamaris's Elbow: Exercises  Your Care Instructions  Here are some examples of typical rehabilitation exercises for your condition. Start each exercise slowly. Ease off the exercise if you start to have pain. Your doctor or physical therapist will tell you when you can start these exercises and which ones will work best for you. How to do the exercises  Wrist extensor stretch    21. Extend your affected arm in front of you and make a fist with your palm facing down. [address] your wrist so that your fist points toward the floor. 23. With your other hand, gently bend your wrist farther until you feel a mild to moderate stretch in your forearm. 24. Hold for at least 15 to 30 seconds. 25. Repeat 2 to 4 times. 26. Repeat steps 1 through 5 with your fingers pointing toward the floor. Forearm extensor stretch    20. Place your affected elbow down at your side, bent at about 90 degrees. Then make a fist with your palm facing down. 21. Keeping your wrist bent, slowly straighten your elbow so your arm is down at your side. Then twist your fist out so your palm is facing out to the side and you feel a stretch. 22. Hold for at least 15 to 30 seconds. 23. Repeat 2 to 4 times. Wrist flexor stretch    21. Extend your affected arm in front of you with your palm facing away from your body. [address] back your wrist, pointing your hand up toward the ceiling. 23. With your other hand, gently bend your wrist farther until you feel a mild to moderate stretch in your forearm. 24. Hold for at least 15 to 30 seconds. 25. Repeat 2 to 4 times.   26. Repeat steps 1 through 5, but this time extend your affected arm in front of you with your palm facing up. Then bend back your wrist, pointing your hand toward the floor. Wrist curls    20. Place your forearm on a table with your hand hanging over the edge of the table, palm up. 21. Place a 1- to 2-pound weight in your hand. This may be a dumbbell, a can of food, or a filled water bottle. 22. Slowly raise and lower the weight while keeping your forearm on the table and your palm facing up. 23. Repeat this motion 8 to 12 times. 24. Switch arms, and do steps 1 through 4.  25. Repeat with your hand facing down toward the floor. Switch arms. Resisted wrist extension    10. Sit leaning forward with your legs slightly spread. Then place your affected forearm on your thigh with your hand and wrist in front of your knee. 11. Grasp one end of an exercise band with your palm down, and step on the other end. 12. Slowly bend your wrist upward for a count of 2, then lower your wrist slowly to a count of 5.  13. Repeat 8 to 12 times. Resisted wrist flexion    5. Sit leaning forward with your legs slightly spread. Then place your affected forearm on your thigh with your hand and wrist in front of your knee. 6. Grasp one end of an exercise band with your palm up, and step on the other end.  7. Slowly bend your wrist upward for a count of 2, then lower your wrist slowly to a count of 5.  8. Repeat 8 to 12 times. Neck stretch to the side    5. This stretch works best if you keep your shoulder down as you lean away from it. To help you remember to do this, start by relaxing your shoulders and lightly holding on to your thighs or your chair. 6. Tilt your head away from your affected elbow and toward your opposite shoulder. For example, if your right elbow is sore, keep your right shoulder down as you lean your head toward your left shoulder. 7. Hold for 15 to 30 seconds. Let the weight of your head stretch your muscles.   8. If you would like a little added stretch, use your hand to gently and steadily pull your head toward your shoulder. For example, if your right elbow is sore, use your left hand to gently pull your head toward your left shoulder. 9. Repeat 2 to 4 times. Resisted forearm pronation    1. Sit leaning forward with your legs slightly spread. Then place your affected forearm on your thigh with your hand and wrist in front of your knee. 2. Grasp one end of an exercise band with your palm up, and step on the other end. 3. Keeping your wrist straight, roll your palm inward toward your thigh for a count of 2, then slowly move your wrist back to the starting position to a count of 5.  4. Repeat 8 to 12 times. Resisted supination    1. Sit leaning forward with your legs slightly spread. Then place your affected forearm on your thigh with your hand and wrist in front of your knee. 2. Grasp one end of an exercise band with your palm down, and step on the other end. 3. Keeping your wrist straight, roll your palm outward and away from your thigh for a count of 2, then slowly move your wrist back to the starting position to a count of 5.  4. Repeat 8 to 12 times. Follow-up care is a key part of your treatment and safety. Be sure to make and go to all appointments, and call your doctor if you are having problems. It's also a good idea to know your test results and keep a list of the medicines you take. Where can you learn more? Go to http://julio c-yoanna.info/. Enter [phone] in the search box to learn more about \"Golfer's Elbow: Exercises. \"  Current as of: March 21, 2017  Content Version: 11.3  © 8231-1327 [employer]. Care instructions adapted under license by [employer] (which disclaims liability or warranty for this information). If you have questions about a medical condition or this instruction, always ask your healthcare professional. [address] any warranty or liability for your use of this information.        Tennis Elbow: Exercises  Your Care Instructions  Here are some examples of typical rehabilitation exercises for your condition. Start each exercise slowly. Ease off the exercise if you start to have pain. Your doctor or physical therapist will tell you when you can start these exercises and which ones will work best for you. How to do the exercises  Wrist flexor stretch    27. Extend your arm in front of you with your palm up. 29. Bend your wrist, pointing your hand toward the floor. 29. With your other hand, gently bend your wrist farther until you feel a mild to moderate stretch in your forearm. 30. Hold for at least 15 to 30 seconds. Repeat 2 to 4 times. Wrist extensor stretch    Repeat steps 1 to 4 of the stretch above but begin with your extended hand palm down. Ball or sock squeeze    24. Hold a tennis ball (or a rolled-up sock) in your hand. 25. Make a fist around the ball (or sock) and squeeze. 26. Hold for about 6 seconds, and then relax for up to 10 seconds. 27. Repeat 8 to 12 times. 28. Switch the ball (or sock) to your other hand and do 8 to 12 times. Wrist deviation    27. Sit so that your arm is supported but your hand hangs off the edge of a flat surface, such as a table. 28. Hold your hand out like you are shaking hands with someone. 29. Move your hand up and down. 30. Repeat this motion 8 to 12 times. 31. Switch arms. 32. Try to do this exercise twice with each hand. Wrist curls    26. Place your forearm on a table with your hand hanging over the edge of the table, palm up. 32. Place a 1- to 2-pound weight in your hand. This may be a dumbbell, a can of food, or a filled water bottle. 28. Slowly raise and lower the weight while keeping your forearm on the table and your palm facing up. 29. Repeat this motion 8 to 12 times. 30. Switch arms, and do steps 1 through 4.  31. Repeat with your hand facing down toward the floor. Switch arms. Biceps curls    14.  Sit leaning forward with your legs slightly spread and your left hand on your left thigh. 15. Place your right elbow on your right thigh, and hold the weight with your forearm horizontal.  16. Slowly curl the weight up and toward your chest.  17. Repeat this motion 8 to 12 times. 18. Switch arms, and do steps 1 through 4. Follow-up care is a key part of your treatment and safety. Be sure to make and go to all appointments, and call your doctor if you are having problems. It's also a good idea to know your test results and keep a list of the medicines you take. Where can you learn more? Go to http://julio c-yoanna.info/. Enter Y180 in the search box to learn more about \"Tennis Elbow: Exercises. \"  Current as of: March 21, 2017  Content Version: 11.3  © 7990-9296 [employer]. Care instructions adapted under license by [employer] (which disclaims liability or warranty for this information). If you have questions about a medical condition or this instruction, always ask your healthcare professional. [address] any warranty or liability for your use of this information.

## 2017-09-19 NOTE — PROGRESS NOTES
AMBULATORY PROGRESS NOTE      Patient: Kaylee Hamman             MRN: [id number]     SSN: [id number] Body mass index is 25.5 kg/(m^2). YOB: 1990     AGE: 24 y.o. EX: male    PCP: Sabino Mallory MD    IMPRESSION/DIAGNOSIS AND TREATMENT PLAN     DIAGNOSES  1. Ulnar neuritis, right    2. Right elbow pain    3. Elbow strain, right, initial encounter        Orders Placed This Encounter    [[id number]] Elbow 3V      Kaylee Hamman understands his diagnoses and the proposed plan. 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 He was playing soccer on injury date September 8, 2017, where he describes more of a hyperextension type injury to his elbow. The history is as below. My impression is ulnar nerve sensory neuritis to the volar portion of his right fifth finger, as well as a muscle strain to the pronator teres muscle group at the proximal medial forearm. Recommendation is for a home stretching program, warm-heat compress to the pronator muscle and home exercise program just for AROM/PROM of the right elbow. There is no instability of the elbow upon varus and valgus stress in stressing the elbow. He does not have full extension of the elbow. He lacks about 10° from achieving full terminal extension of his elbow. I need to see him and hear from him in the next two weeks. Should his symptoms worsen, of course, we will see him sooner. I suspect the ulnar nerve neuritis will improve. HIs range of motion should improve as well, but I encourage range of motion. I see no indication for an MRI or a CT scan at this point for his elbow. If there is gross instability or lack of endpoint when stressing the elbow, i.e. instability, then I would certainly recommend an MRI.        Plan:    1) HEP  2) Continue activity as tolerated    RTO - PRN    HPI AND EXAMINATION     Kaylee Hamman IS A 24 y.o. male who presents to my outpatient office complaining of right arm and right shoulder pain. He rates his right elbow pain at 6/10 today. The patient reports that while playing soccer he hyperextended his right arm, that was subsequently pulled after being hyperextended. He notes that he had a collision with another individual while running down the field. This incident occurred on 9/08/2017. He notes that one of his medical school instructors did an [address] on his right elbow, and recommended looking for a second opinion. Cardiovascular/Peripheral Vascular: Normal Pulses to each hand and foot  Musculoskeletal:  LOCATION:   right arm    HAND, WRIST, FOREARM:  right         Integumentary: No rashes, skin patches, wounds, blisters, or abrasions    Warm and normal color. No regions of expressible drainage       Deformities:  Is not present       Swelling: Regions of abnormal swelling: mild to pronator teres region       Tenderness: There is regions of tenderness :  over pronator muscles proximally to his medial epicondyle                   Tenderness through his cubital tunnel        Motor/Strength/Tone Exam: normal 5/5 strength in all tested muscle groups       Sensory Exam:    sensory deficits noted: decrease sensation 5 th finger volar aspect       Stability Testing: NONE to varus valgus stress testing       ROM: full range with pain       Contractures: No fixed contracture         Vascular : Normal capillary refill and digital coloration   No embolic phenomena to the toes or hands   Edema is not present         Neuro Exam:  Sensation : sensory deficits noted: decrease sensation 5 th finger volar aspect           Motor function: Normal to wrist and forearm    CHART REVIEW     Past Medical History:   Diagnosis Date    Benign hypertension without CHF      Current Outpatient Prescriptions   Medication Sig    Minoxidil 5 % foam by Apply Externally route.  finasteride (PROPECIA) 1 mg tablet Take 1 Tab by mouth daily.     hydroCHLOROthiazide (HYDRODIURIL) 12.5 mg tablet Take 1 Tab by mouth daily. No current facility-administered medications for this visit. Allergies   Allergen Reactions    Sulfa (Sulfonamide Antibiotics) Swelling     Past Surgical History:   Procedure Laterality Date    HX HEENT      HX TONSIL AND ADENOIDECTOMY      approx 2005     Social History     Occupational History    Not on file. Social History Main Topics    Smoking status: Never Smoker    Smokeless tobacco: Never Used      Comment: Pt counseled to not smoke.  Alcohol use 0.6 oz/week     1 Shots of liquor per week      Comment: socially    Drug use: No    Sexual activity: Yes     Partners: Female     Family History   Problem Relation Age of Onset    No Known Problems Mother     Hypertension Father     Hypertension Brother     Cancer Maternal Grandfather      glioblastoma    Heart Disease Paternal Grandfather      3 MI's/CHF    Alcohol abuse Paternal Grandfather     Dementia Other        REVIEW OF SYSTEMS : 9/19/2017  ALL BELOW ARE Negative except : SEE HPI       Constitutional: Negative for fever, chills and weight loss. Neg Weigh Loss  Cardiovascular: Negative for chest pain, claudication and leg swelling. SOB, SUNG   Gastrointestinal: Negative for  pain, N/V/D/C, Blood in stool or urine,dysuria, hematuria,        Incontinence, pelvic pain  Musculoskeletal: see HPI. Neurological: Negative for dizziness and weakness. Negative for headaches,Visual Changes, Confusion, Seizures,   Psychiatric/Behavioral: Negative for depression, memory loss and substance abuse. Extremities:  Negative for  hair changes, rash or skin lesion changes. Hematologic: Negative for Bleeding problems, bruising, pallor or swollen lymph nodes.   Peripheral Vascular: No calf pain, vascular vein tenderness to calf pain              No calf throbbing, posterior knee throbbing pain    DIAGNOSTIC IMAGING      Dictation on: 09/19/2017  1:31 PM by: Oj Lin [[id number]]         Written by Per Westbrook ScribeKick, as dictated by Roseann Kent MD. I, , Roseann Kent MD, confirm that all documentation is accurate.

## 2017-09-19 NOTE — PROCEDURES
X-rays of the right elbow to include the proximal forearm, both bone forearm, this AP view shows that the elbow is concentrically aligned at the radiocapitellar region at the ulnar humeral region, attention medial side. I see no avulsion fracture to the medial aspect of the elbow at the medial epicondylar region. The oblique film shows, again, concentric alignment of the elbow. There is some soft tissue shadow markings near the radial aspect in this oblique film. The lateral film shows, to my eye on this interpretation, no gross effusion posteriorly or anteriorly to the elbow. I see no evidence of a fracture on these films. It is not a lateral film, but I do not see any effusion anteriorly. I do not see any fracture to the coronoid portion of the ulna. No loose bodies are seen.

## 2018-02-12 ENCOUNTER — OFFICE VISIT (OUTPATIENT)
Dept: INTERNAL MEDICINE CLINIC | Age: 28
End: 2018-02-12

## 2018-02-12 VITALS
OXYGEN SATURATION: 99 % | WEIGHT: 193.8 LBS | HEIGHT: 72 IN | TEMPERATURE: 97 F | SYSTOLIC BLOOD PRESSURE: 122 MMHG | HEART RATE: 85 BPM | BODY MASS INDEX: 26.25 KG/M2 | RESPIRATION RATE: 18 BRPM | DIASTOLIC BLOOD PRESSURE: 90 MMHG

## 2018-02-12 DIAGNOSIS — I10 BENIGN HYPERTENSION WITHOUT CHF: ICD-10-CM

## 2018-02-12 DIAGNOSIS — Z00.00 ROUTINE GENERAL MEDICAL EXAMINATION AT A HEALTH CARE FACILITY: Primary | ICD-10-CM

## 2018-02-12 NOTE — PATIENT INSTRUCTIONS
1) follow-up as needed or sooner if worsening symptoms. 2) monitor BP and let us know if too high or too low. 3) monitor BP daily.

## 2018-02-12 NOTE — MR AVS SNAPSHOT
Yuliana Yatesil 
 
 
 [address] [facility] [address] [address] 
[phone] Patient: Kavon Tracy MRN: [id number] HXN:[birth year] Visit Information Date & Time Provider Department Dept. Phone Encounter #  
 2/12/2018  3:00 PM Louie Sesay MD [phone] [id number] Follow-up Instructions Return if symptoms worsen or fail to improve. Upcoming Health Maintenance Date Due DTaP/Tdap/Td series (2 - Td) 10/16/2026 Allergies as of 2/12/2018  Review Complete On: 2/12/2018 By: Louie Sesay MD  
  
 Severity Noted Reaction Type Reactions Sulfa (Sulfonamide Antibiotics)  10/21/2016    Swelling Current Immunizations  Reviewed on 11/28/2016 Name Date  
 TB Skin Test (PPD) Intradermal 6/29/2017 12:00 AM, 11/28/2016 11:59 AM  
 Tdap 10/16/2016 12:00 AM  
  
 Not reviewed this visit Vitals BP Pulse Temp Resp Height(growth percentile) Weight(growth percentile) 122/90 (BP 1 Location: Left arm, BP Patient Position: Sitting) 85 97 °F (36.1 °C) (Oral) 18 6' (1.829 m) 193 lb 12.8 oz (87.9 kg) SpO2 BMI Smoking Status 99% 26.28 kg/m2 Never Smoker Vitals History BMI and BSA Data Body Mass Index Body Surface Area  
 26.28 kg/m 2 2.11 m 2 Preferred Pharmacy Pharmacy Name Phone [facility] [address] [phone] Your Updated Medication List  
  
   
This list is accurate as of: 2/12/18  3:31 PM.  Always use your most recent med list.  
  
  
  
  
 finasteride 1 mg tablet Commonly known as:  Tl Pichardo Take 1 Tab by mouth daily. Minoxidil 5 % Foam  
by Apply Externally route. Follow-up Instructions Return if symptoms worsen or fail to improve. Patient Instructions 1) follow-up as needed or sooner if worsening symptoms. 2) monitor BP and let us know if too high or too low. 3) monitor BP daily. Introducing [facility]! Rodney Bautista introduces InboxQ patient portal. Now you can access parts of your medical record, email your doctor's office, and request medication refills online. 1. In your internet browser, go to [email or web address] 2. Click on the First Time User? Click Here link in the Sign In box. You will see the New Member Sign Up page. 3. Enter your InboxQ Access Code exactly as it appears below. You will not need to use this code after youve completed the sign-up process. If you do not sign up before the expiration date, you must request a new code. · InboxQ Access Code: [email or web address] Expires: 5/13/2018  3:31 PM 
 
4. Enter the last four digits of your Social Security Number (xxxx) and Date of Birth (mm/dd/yyyy) as indicated and click Submit. You will be taken to the next sign-up page. 5. Create a InboxQ ID. This will be your InboxQ login ID and cannot be changed, so think of one that is secure and easy to remember. 6. Create a InboxQ password. You can change your password at any time. 7. Enter your Password Reset Question and Answer. This can be used at a later time if you forget your password. 8. Enter your e-mail address. You will receive e-mail notification when new information is available in [address] 9. Click Sign Up. You can now view and download portions of your medical record. 10. Click the Download Summary menu link to download a portable copy of your medical information. If you have questions, please visit the Frequently Asked Questions section of the InboxQ website. Remember, InboxQ is NOT to be used for urgent needs. For medical emergencies, dial 911. Now available from your iPhone and Android! Please provide this summary of care documentation to your next provider. Your primary care clinician is listed as Ortiz Moseley. If you have any questions after today's visit, please call [phone].

## 2018-02-12 NOTE — PROGRESS NOTES
Chief Complaint   Patient presents with    Hypertension     f/u    Complete Physical    Medication Refill       HPI:     Suyapa Ozuna is a 24 y.o.  male with history of hypertension  here for the above complaint. He said his BP is normal at home. He is working on diet and exercise. BP today: 127/77. There might be component of white coat syndrome. Also,pt under a lot of stress. He has not been taking his HCTZ for several months because he wants to see if can control with diet and exercise. He said at home, bp always lower than 130's. Past Medical History:   Diagnosis Date    Benign hypertension without CHF     White coat syndrome with hypertension        Past Surgical History:   Procedure Laterality Date    HX HEENT      HX TONSIL AND ADENOIDECTOMY      approx 2005           MEDICATION ALLERGIES/INTOLERANCES:   Allergies   Allergen Reactions    Sulfa (Sulfonamide Antibiotics) Swelling             CURRENT MEDICATIONS:    Current Outpatient Prescriptions   Medication Sig    Minoxidil 5 % foam by Apply Externally route.  finasteride (PROPECIA) 1 mg tablet Take 1 Tab by mouth daily. No current facility-administered medications for this visit. Health Maintenance   Topic Date Due    DTaP/Tdap/Td series (2 - Td) 10/16/2026    Influenza Age 5 to Adult  Addressed         FAMILY HISTORY:   Family History   Problem Relation Age of Onset    No Known Problems Mother     Hypertension Father     Hypertension Brother     Cancer Maternal Grandfather      glioblastoma    Stroke Maternal Grandfather     Heart Disease Paternal Grandfather      3 MI's/CHF    Alcohol abuse Paternal Grandfather     Dementia Other        SOCIAL HISTORY:   He  reports that he has never smoked.  He has never used smokeless tobacco.  He  reports that he drinks about 0.6 oz of alcohol per week       [address]:  Flu shot: 2017  TDAP: 2016    ROS:   General: negative for - chills, fatigue, fever, weight loss or weight gain, night sweats  HEENT: negative for - no sore throat, nasal congestion, vision problems or ear problems  Resp: negative for - cough, shortness of breath or wheezing  CV: negative for - chest pain, palpitations, orthopnea or PND,  GI: negative for - abdominal pain, change in bowel habits, constipation, diarrhea,  or black tarry  or nausea/vomiting, positive for blood in stool most likely due to hemorrhoids. : negative for - dysuria, hematuria, increase urgency and frequency, nocturia  Heme: negative for -excessive bleeding or bruising  Endo: negative for - polydipsia/polyuria or hot or cold intolerance  MSK: negative for - joint pain, joint swelling or muscle pain  Neuro: negative for - numbness, tingling, headache or dizziness  Derm: negative for - dry skin, hair changes, rash or skin lesion changes  Psych: negative for - anxiety, depression, irritability or mood swings, insomnia    OBJECTIVE:  PHYSICAL EXAM: Vitals:   Vitals:    02/12/18 1510 02/12/18 1517 02/12/18 1529   BP: (!) 151/98 (!) 149/91 122/90   Pulse: 85     Resp: 18     Temp: 97 °F (36.1 °C)     TempSrc: Oral     SpO2: 99%     Weight: 193 lb 12.8 oz (87.9 kg)     Height: 6' (1.829 m)       Exam:   Generally: Pleasant male in no acute distress    HEENT exam: Head: atraumatic     Eyes: Pupils equally round and reactive to light; fundoscopic exam is                         normal               Ears: bilaterally normal tympanic membrane, no erythema or exudate,                         normal light reflex               Mouth: clear, no erythema or exudate    Nares: moist mucosa/no erythema     Neck: supple, no lymphadenopathy, negative thyromegaly, negative                         carotid bruits bilaterally    Cardiac exam: regular, rate, and rhythm. No murmurs, gallops, or rubs. Normal S1 and S2.    Pulmonary exam: Clear to ausculation bilaterally    Abdominal exam: Positive bowel sounds in all four quadrants. Soft. Nondistended. Nontender. No hepatosplenomegaly. Prostate exam: deferred due to age    extremities: 2+ dorsalis pedis bilaterally. No pedal edema bilaterally. Musculoskeletal exam: 5 out of 5 strength in upper and lower extremities bilaterally. Good range of motion in upper and lower extremities. 2 out of 2 reflexes in upper and lower extremities bilaterally. Neurological exam: Cranial nerves II-XII all intact. Normal sensation in upper and lower extremities. Normal gait. LABS/RADIOLOGICAL TESTS:   Lab Results   Component Value Date/Time    WBC 6.0 10/22/2016 04:17 PM    HGB 15.7 10/22/2016 04:17 PM    HCT 48.9 (H) 10/22/2016 04:17 PM    PLATELET 097 93/57/4912 04:17 PM     Lab Results   Component Value Date/Time    Sodium 140 10/22/2016 04:17 PM    Potassium 3.9 10/22/2016 04:17 PM    Chloride 102 10/22/2016 04:17 PM    CO2 30 10/22/2016 04:17 PM    Glucose 80 10/22/2016 04:17 PM    BUN 13 10/22/2016 04:17 PM    Creatinine 1.05 10/22/2016 04:17 PM     No results found for: CHOL, CHOLX, CHLST, CHOLV, HDL, LDL, LDLC, DLDLP, TGLX, TRIGL, TRIGP  No results found for: GPT  Previous labs          ASSESSMENT/PLAN:      ICD-10-CM ICD-9-CM    1. Routine general medical examination at a health care facility Z00.00 V70.0    2. Benign hypertension without CHF I10 401.1 Stable. Continue diet and exercise and off HCTZ. Think bp up in office due to white coat syndrome. He will monitor his BP daily and let us know if too high or too low. He did not want to make a f/u at this time. He will let us know if needs f/u.     3. Patient verbalized understanding and agreement with the plan. 4. Patient was given an after visit summary. 5.   Follow-up Disposition:  Return if symptoms worsen or fail to improve. or sooner if worsening symptoms.           Margoth Simmons MD

## 2018-02-12 NOTE — PROGRESS NOTES
ROOM # 1    Elis Oh presents today for   Chief Complaint   Patient presents with    Hypertension     f/u    Complete Physical    Medication Refill       Elis Delarosa preferred language for health care discussion is english/other. Is someone accompanying this pt? no    Is the patient using any DME equipment during OV? no    Depression Screening:  PHQ over the last two weeks 2/12/2018 4/20/2017 12/1/2016 10/26/2016   Little interest or pleasure in doing things Not at all Not at all Not at all Not at all   Feeling down, depressed or hopeless Not at all Not at all Not at all Not at all   Total Score PHQ 2 0 0 0 0       Learning Assessment:  Learning Assessment 10/26/2016   PRIMARY LEARNER Patient   HIGHEST LEVEL OF EDUCATION - PRIMARY LEARNER  > 4 YEARS OF COLLEGE   BARRIERS PRIMARY LEARNER NONE   CO-LEARNER CAREGIVER No   PRIMARY LANGUAGE ENGLISH   LEARNER PREFERENCE PRIMARY READING   ANSWERED BY patient   RELATIONSHIP SELF       Abuse Screening:  No flowsheet data found. Fall Risk  No flowsheet data found. Health Maintenance reviewed and discussed per provider. Yes      Advance Directive:  1. Do you have an advance directive in place? Patient Reply: no    2. If not, would you like material regarding how to put one in place? Patient Reply: no    Coordination of Care:  1. Have you been to the ER, urgent care clinic since your last visit? Hospitalized since your last visit? no    2. Have you seen or consulted any other health care providers outside of the [address] [place] [address] since your last visit? Include any pap smears or colon screening.  no

## 2018-04-11 NOTE — PROGRESS NOTES
Chief Complaint   Patient presents with    Fever     t max 102 @ 0600    Chills     x2 days    Generalized Body Aches     x2 days       HPI:     Janessa Eagle is a 24 y.o.  male with history of  Hypertension here for the above complaint. He has fevers and chills yesterday. He had headaches and too zyrtec. Fever:100.5. Woke up 6am: 102. He has chills and body aches. He has post nasal drainage, sore throat. Slight cough. 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 He denies any chest pain, shortness of breath, abdominal pain, dizziness. Past Medical History:   Diagnosis Date    Benign hypertension without CHF      Past Surgical History:   Procedure Laterality Date    HX HEENT      HX TONSIL AND ADENOIDECTOMY      approx 2005     Current Outpatient Prescriptions   Medication Sig    oseltamivir (TAMIFLU) 75 mg capsule Take 1 Cap by mouth two (2) times a day for 5 days.  finasteride (PROPECIA) 1 mg tablet Take 1 Tab by mouth daily.  hydroCHLOROthiazide (HYDRODIURIL) 12.5 mg tablet Take 1 Tab by mouth daily. No current facility-administered medications for this visit. Health Maintenance   Topic Date Due    DTaP/Tdap/Td series (2 - Td) 10/16/2026    INFLUENZA AGE 9 TO ADULT  Addressed     Immunization History   Administered Date(s) Administered    TB Skin Test (PPD) Intradermal 11/28/2016     No LMP for male patient. Allergies and Intolerances: Allergies   Allergen Reactions    Sulfa (Sulfonamide Antibiotics) Swelling       Family History:   Family History   Problem Relation Age of Onset    No Known Problems Mother     Hypertension Father     Hypertension Brother     Cancer Maternal Grandfather      glioblastoma    Heart Disease Paternal Grandfather      3 MI's/CHF    Alcohol abuse Paternal Grandfather     Dementia Other        Social History:   He  reports that he has never smoked.  He has never used smokeless tobacco.  He  reports that he Please remember to call and schedule a follow up appointment if one was recommended at your earliest convenience.  Orthopedics CLINIC HOURS TELEPHONE NUMBER   Dr. Lela Frausto  Certified Medical Assistant   Monday & Wednesday   8am - 5pm  Thursday 1pm - 5pm  Friday 8am -11:30am Specialty schedulers:   [phone] to make an appointment with any Specialty Provider.   Main Clinic:   [phone] to make an appointment with your primary provider   Urgent Care locations:    [facility] Monday-Friday Closed  Saturday-Sunday 9am-5pm      Monday-Friday 12pm - 8pm  Saturday-Sunday 9am-5pm [phone]     If SURGERY has been recommended, please call our Specialty Schedulers at [phone] to schedule your procedure.    If you need a medication refill, please contact your pharmacy. Please allow 3 business days for your refill to be completed.    If an MRI or CT scan has been recommended, please call [facility] Schedulers at [phone] to schedule your appointment.  Use [employer] (secure e-mail communication and access to your chart) to send a message or to make an appointment. Please ask how you can sign up for [employer].  Your care team's suggested websites for health information:   [email or web address] : Up to date and easily searchable information on multiple topics.   [email or web address] : [place] dept of heat, public health issues in [place], N1N1       drinks about 0.6 oz of alcohol per week             ·     Objective:   Physical exam:   Visit Vitals    BP (!) 144/94 (BP 1 Location: Right arm, BP Patient Position: Sitting)    Pulse 100  Comment: right radial pulse    Temp 99.5 °F (37.5 °C) (Oral)    Resp 16    Ht 6' (1.829 m)    Wt 185 lb 9.6 oz (84.2 kg)    SpO2 97%    BMI 25.17 kg/m2        Generally: Pleasant male in no acute distress  HEENT Exam: Head: atraumatic               Eyes: PERRLA    Ears: bilaterally normal TM, no erythema or exudate, normal light reflex    Nares: moist mucosa, no erythema    Mouth: Clear, no erythema or exudate    Neck: supple, no LAD  Cardiac Exam: regular, rate, and rhythm. Normal S1 and S2. No murmurs, gallops, or rubs  Pulmonary exam: Clear to auscultation bilaterally      LABS/Radiological Tests:  Lab Results   Component Value Date/Time    WBC 6.0 10/22/2016 04:17 PM    HGB 15.7 10/22/2016 04:17 PM    HCT 48.9 10/22/2016 04:17 PM    PLATELET 432 17/71/2180 04:17 PM     Lab Results   Component Value Date/Time    Sodium 140 10/22/2016 04:17 PM    Potassium 3.9 10/22/2016 04:17 PM    Chloride 102 10/22/2016 04:17 PM    CO2 30 10/22/2016 04:17 PM    Glucose 80 10/22/2016 04:17 PM    BUN 13 10/22/2016 04:17 PM    Creatinine 1.05 10/22/2016 04:17 PM     No results found for: CHOL, CHOLX, CHLST, CHOLV, HDL, LDL, DLDL, LDLC, DLDLP, TGL, TGLX, TRIGL, TRIGP  No results found for: GPT    Previous labs      ASSESSMENT/PLAN:    1. Influenza  -     oseltamivir (TAMIFLU) 75 mg capsule; Take 1 Cap by mouth two (2) times a day for 5 days. - increase po fluids and tylenol for fevers with no more than 4000mg a day      2. Requested Prescriptions     Signed Prescriptions Disp Refills    oseltamivir (TAMIFLU) 75 mg capsule 10 Cap 0     Sig: Take 1 Cap by mouth two (2) times a day for 5 days. 3. Patient verbalized understanding and agreement with the plan. 4. Patient was given an after-visit summary.     5.   Follow-up Disposition:  Return if symptoms worsen or fail to improve. or sooner if worsening symptoms.                 Macy Meyers MD

## 2018-10-18 ENCOUNTER — APPOINTMENT (OUTPATIENT)
Dept: PHYSICAL THERAPY | Age: 28
End: 2018-10-18

## 2018-10-30 ENCOUNTER — HOSPITAL ENCOUNTER (OUTPATIENT)
Dept: PHYSICAL THERAPY | Age: 28
Discharge: HOME | End: 2018-10-30
Payer: COMMERCIAL

## 2018-10-30 PROCEDURE — 97161 PT EVAL LOW COMPLEX 20 MIN: CPT | Performed by: PHYSICAL THERAPIST

## 2018-10-30 PROCEDURE — 97110 THERAPEUTIC EXERCISES: CPT | Performed by: PHYSICAL THERAPIST

## 2018-10-30 PROCEDURE — 97140 MANUAL THERAPY 1/> REGIONS: CPT | Performed by: PHYSICAL THERAPIST

## 2018-10-30 NOTE — PROGRESS NOTES
PT  EVAL AND TREATMENT Patient Name: Afshin Starr Date:10/30/2018 : 1990 [x]  Patient  Verified Payor: Ashley Tinoco / Plan: [employer] / Product Type: HMO /   
In time:445pm  Out time:600pm 
Total Treatment Time (min): 75 Total Timed Codes (min): 65 
1:1 Treatment Time ( [address] only): 65 Visit #: 1 of  Treatment Area: Acute back pain [M54.9] Pain in: 3 Objective evaluation: 
Physical Therapy Evaluation - Lumbar Spine ([facility]) Pt states this pain has been going on for more than 6 months ago. He fell a few months back on his buttock after R toe stub. He has noticed his R toe catches often. Pt reports MRI reveals L4/L5 annular tear, stenosis at L4-S1, Propulsion on L5 nerve root. Per pt reports sx originate in R  l/s and refer to R buttock and occasionally up the R ps mm. Pain increases with stomach sleeping, prolonged sitting increases R LE sx, walking/ standing has no effect on sx. Pt able to play soccer but reports a constant dull pain in R low back. Functional limitations: fwd bending, lifting, gym workouts (crossfit). Heat to mm to reduce sx. No previous treatment for l/s pain. SUBJECTIVE Chief Complaint: 
 
Mechanism of injury: unknown Symptoms: 
Pain rating (0-10): Today: 3 Best: 1 Worst: 9 [x] Contstant:  
 [] Intermittent: 
  
Aggravated by: 
 [x] Bending [x] Sitting [] Standing [] Walking 
 [] Moving [] Cough [] Sneeze [] Valsalva 
 [] AM  [] PM  Lying:  [] sup   [] pro   [x] sidelying 
 [] Other: 
  
Eased by:  
 [] Bending [] Sitting [] Standing [] Walking 
 [] Moving [] AM  [] PM  Lying: [x] sup  [] pro  [] sidelying 
 [] Other: 
  
General Health:  Red Flags Indicated? [] Yes    [x] No 
[] Yes [] No Recent weight change (If yes, due to dieting? [] Yes  [] No) [] Yes [] No Weakness in legs during walking 
[] Yes [] No Unremitting pain at night 
[] Yes [] No Abdominal pain or problems 
[] Yes [] No Rectal bleeding [] Yes [] No Feet more cold or painful in cold weather 
[] Yes [] No Menstrual irregularities 
[] Yes [] No Blood or pain with urination 
[] Yes [] No Dysfunction of bowel or bladder 
[] Yes [] No Recent illness within past 3 weeks (i.e, cold, flu) 
[] Yes [] No Numbness/tingling in buttock/genitalia region Past History/Treatments:  
 
Diagnostic Tests: [] Lab work [x] X-rays    [] CT [x] MRI     [] Other: 
Results: see above What position do you sleep in?: supine OBJECTIVEPosture: 
Lateral Shift: [] R    [] L     [] +  [] - 
Kyphosis: [] Increased [] Decreased   []  WNL Lordosis:  [x] Increased [] Decreased   [] WNL Pelvic symmetry: [] WNL    [] Other: slightly higher on R, with QL tightness Gait:  [x] Normal     [] Abnormal: 
 
Active Movements: [] N/A   [] Too acute   [] Other: ROM % AROM % PROM Comments:pain, area Forward flexion 40-60 75%  pain Extension 20-30 50%  Shootng pain in R l/s, worst pain SB right 20-30 50%  Pain on R side SB left 20-30 60% Rotation right 5-10 75  tight Rotation left 5-10 75  tight R hip flexion: 105deg, L hip flexion; 130deg Neuro Screen [x] WNL Myotome/Dermatome/Reflexes: 
Comments: 
 
Dural Mobility: SLR Sitting: [] R    [] L    [] +    [] -  @ (degrees):  
        Supine: [x] R    [] L    [] +    [] -  @ (degrees): 45 
Slump Test: [] R    [] L    [] +    [] -  @ (degrees): Prone Knee Bend: [] R    [] L    [] +    [] -  
 
Palpation [] Min  [x] Mod  [] Severe    Location: R QL mm, R SIJ, PA glides at L5 and S1 
[] Min  [] Mod  [] Severe    Location: 
[] Min  [] Mod  [] Severe    Location: 
 
  
Strength is 5/5 overall L(0-5) R (0-5) N/T Hip Flexion (L1,2) 5 4+p! []  
Knee Extension (L3,4) 5 5 [] Ankle Dorsiflexion (L4) 5 5 [] Great Toe Extension (L5) 5 5 [] Ankle Plantarflexion (S1) Nt Nt [] Knee Flexion (S1,2) 5 5 [] Upper Abdominals   [] Lower Abdominals   [] Paraspinals   [] Back Rotators   [] Gluteus Jarod 5 5 [] Other bridge  90%p  [] Special Tests Lumbar:  Lumb. Compression: [] Pos  [] Neg            
  Lumbar Distraction:   [] Pos  [] Neg 
  Quadrant:  [x] Pos  [] Neg   [] Flex  [x] Ext Sacroilliac:  Gaenslen's: [] R    [] L    [] +    [] -  
  Compression: [] +    [] -  
  Gapping:  [x] +    [] - Thigh Thrust: [] R    [] L    [] +    [] - Leg Length: [x] +    [] -   Position:R LE longer in supine Crests: 
  ASIS:slight R ant rot noted with L upslip PSIS: 
  Sacral Sulcus: 
  Mobility: Standing flex: 
   Sitting flex: 
   Supine to sit: 
   Prone knee bend: 
 
     Hip: Anaid Clock:  [] R    [] L    [] +    [x] - Scour:  [] R    [] L    [] +    [] - Piriformis: [] R    [x] L    [] +    [x] -  R tightness Deficits: Aicha's: [] R    [] L    [] +    [] - Thierry Pinna: [] R    [] L    [] +    [] - Hamstrings 90/90:+ -45deg , 
  Gastrocsoleus (to neutral): Right: Left: 
    
Global Muscular Weakness: 
Abdominals: 
Quadratus Lumborum:+ and tight on R Paraspinals: Other: 
 
Other tests/comments:  
 
Justification for Eval Code Complexity: 
Patient History : na 
Examination see exam as above Clinical Presentation: evolving Clinical Decision Making : FOTO : 65 /100 Manual therapy: MET for pelvic rot and L upslip 8 min Modality (rationale): MHP for palliative and decreased mm tension []  E-Stim: type _ x _ min     []att   []unatt   []w/US   []w/ice   []w/heat 
[]  Traction: []cerv   []pelvic   _ lbs x _ min     []pro   []sup   []int   []const 
[]  Ultrasound: []cont   []pulse    _ W/cm2 x _  min   []1MHz   []3MHz 
[]  Iontophoresis: []take home patch w/ dexamethazone    []40mA   []80mA 
                             []_ mA min w/: []dexamethazone   []other:_ 
[]  Ice pack _  min     [x] Hot pack 10_  min     [] Paraffin _  min 
[]  Other:  
 
Patient Education: [x] Established HEP    [x] POT (minutes) :15 min development and instruction Pain Level (0-10 scale) post treatment:  2-3 
ASSESSMENT [x]  See Plan of Care PLAN [x]  Upgrade activities as tolerated    
[x] Other:_ POC  2x/wk for 4 weeks Ana Schaffer PT 10/30/2018  2:09 PM

## 2018-10-30 NOTE — PROGRESS NOTES
[address] [facility] [address] [facility] [address] Phone: [phone] Fax: [phone] PLAN OF CARE / STATEMENT OF MEDICAL NECESSITY FOR PHYSICAL THERAPY SERVICES Patient Name: Sridhar Archibald : 1990 Medical  
Diagnosis: Acute back pain [M54.9] Treatment Diagnosis: L/s Onset Date: Few months ago Referral Source: Itzel Amin MD Start of Care [facility]): 10/30/2018 Prior Hospitalization: See medical history Provider #: [id number] Prior Level of Function: Indep, plays soccer Comorbidities: NA Medications: Verified on Patient Summary List  
The Plan of Care and following information is based on the information from the initial evaluation.  
======================================================================== Assessment / key information:   Patient  is a 34 y.o. yo male who presents to [facility] with diagnosis of Acute back pain [M54.9]. Pt states this pain has been going on for more than 6 months ago. He fell a few months back on his buttock after stubbing R toe, however he feels he was having pain prior to this. He has noticed his R toe catches often. Pt reports MRI reveals L4/L5 annular tear, stenosis at L4-S1, Propulsion on L5 nerve root. Per pt reports sx originate in R  l/s and refer to R buttock and occasionally up the R ps mm. Pain increases with stomach sleeping, prolonged sitting increases R LE sx, walking/ standing has no effect on sx. Pt able to play soccer but reports a constant dull pain in R low back. Functional limitations: fwd bending, lifting, gym workouts (crossfit). Heat to mm to reduce sx. No previous treatment for l/s pain.  Upon objective evaluation, patient demonstrates SI hypomobility on L SIJ with fwd flexion test, impaired and painful trunk AROM in extension and R SB, LE strength is WNL, core strength: 90% bridge with p!, postural deviation of increased lordosis, L upslip, R ant rot innom. and impaired flexibility of B HS, and R QL, R>L piriformis muscles. There is tenderness to palpation over R QL mm, R SIJ, PA glides at L5 and S1 Spinous process, L5/S1. SLR on R special test is positive at 45deg, 90/90 HS test+. Patient scored 72 on FOTO indicating decreased functional status and quality of life. A home exercise program was demonstrated and provided to address the above objective and functional deficits. Patient can benefit from skilled PT interventions to improve AROM,l/s stability decrease pain and tissue tension and for education on posture, body mechanics and lifting to facilitate ADLs & overall functional status/quality of life.  
========================================================================= Eval Complexity: History: LOW Complexity : Zero comorbidities / personal factors that will impact the outcome / POCExam:MEDIUM Complexity : 3 Standardized tests and measures addressing body structure, function, activity limitation and / or participation in recreation  Presentation: MEDIUM Complexity : Evolving with changing characteristics  Clinical Decision Making:MEDIUM Complexity : FOTO score of 26-74Overall Complexity:MEDIUM Problem List: pain affecting function, decrease ROM, decrease strength, decrease activity tolerance, decrease flexibility/ joint mobility and decrease transfer abilities Treatment Plan may include any combination of the following: Therapeutic exercise, Therapeutic activities, Neuromuscular re-education, Physical agent/modality, Gait/balance training, Manual therapy, Aquatic therapy, Patient education, Self Care training and Functional mobility training Patient / Family readiness to learn indicated by: asking questions, trying to perform skills and interest 
Persons(s) to be included in education: patient (P) Barriers to Learning/Limitations: None Measures taken: Patient Goal (s): Reduce pain with daily activities Patient self reported health status: good Rehabilitation Potential: good ? Short Term Goals: To be accomplished in  2  weeks: 
1) Establish HEP to prevent further disability. 2) Patient will report decreased c/o pain to < or = 2avg/10 to facilitate recreational act, and prolonged sitting in class with manageable sx in l/s and buttock. ? Long Term Goals: To be accomplished in  8-10  treatments: 
1) Patient to be independent & compliant with HEP in preparation for D/C. 2)  Patient to increase FOTO score to 80 indicating improved functional abilities and QOL. 3) Patient to increase HS length  to -30deg B to decrease stress to l/s . 4.) Patient to present with normal pelvic alignment for 3 sessions in a row to decrease stress to l/s mm. 
5.) Patient able to sit for 3 hrs without l/s increase in sx, to facilitate school work. Frequency / Duration:   Patient to be seen  2  times per week for 4  weeks: 
Patient / Caregiver education and instruction: activity modification and exercises G-Codes (GP): heather Therapist Signature: Saul Galeazzi, PT Date: 10/30/2018 Certification Period: na Time: 2:08 PM  
======================================================================== I certify that the above Physical Therapy Services are being furnished while the patient is under my care. I agree with the treatment plan and certify that this therapy is necessary. Physician Signature:       Date:      Time: 
Please sign and return to [facility] or you may fax the signed copy to [phone]. Thank you.

## 2018-11-07 ENCOUNTER — HOSPITAL ENCOUNTER (OUTPATIENT)
Dept: PHYSICAL THERAPY | Age: 28
Discharge: HOME | End: 2018-11-07
Payer: COMMERCIAL

## 2018-11-07 PROCEDURE — 97140 MANUAL THERAPY 1/> REGIONS: CPT

## 2018-11-07 PROCEDURE — 97110 THERAPEUTIC EXERCISES: CPT

## 2018-11-07 NOTE — PROGRESS NOTES
PT DAILY TREATMENT NOTE  Patient Name: Karolyn Goldmann Date:2018 : 1990 [x]  Patient  Verified Payor: Abida Ramos / Plan: [employer] PPO / Product Type: PPO / In time: 3:00 pm               Out time: 3:55 pm 
Total Treatment Time (min): 55 Visit #: 2 of  Treatment Area: Acute back pain [M54.9] SUBJECTIVE Pain Level (0-10 scale): 3 Any medication changes, allergies to medications, adverse drug reactions, diagnosis change, or new procedure performed?: [x] No    [] Yes (see summary sheet for update) Subjective functional status/changes:   [x] No changes reported \"It's always there. I still play soccer and ignore it. \" OBJECTIVEModality rationale: decrease edema, decrease inflammation and decrease pain to improve the patients ability to perform ADLs Min Type Additional Details  
 [] Estim: []Att   []Unatt []IFC  []Premod                                            []NMES    []Other:  []w/ice   []w/heat Position: Location:  
 []  Traction: [] Cervical       [] Lumbar 
                     [] Supine        [] Prone []Intermittent   []Continuous Lbs: 
[] before manual 
[] after manual  
 []  Ice     []  heat 
[]  Ice massage Position: Location:  
[x] Skin assessment post-treatment:  [x]intact    []redness- no adverse reaction 
                                                                               []redness  adverse reaction:  
 
45 min Therapeutic Exercise:  [x] See flow sheet: initiated therex per IE Rationale: increase ROM, increase strength and improve coordination to improve the patients ability to perform ADLs, return to gym workouts 10 min Manual Therapy:  (R) post inn rotation corrected with self-MET x 3 attempts; recheck - symmetrical; prone TPR to (R) glut med, max, and piriformis Rationale: decrease pain, increase ROM, decrease trigger points and improve pelvic mobility to improve the patient's ability to amb with normalized gait pattern 
       
with TE min Patient Education: [x] Review HEP - self-MET for (R) post inn rotation and piriformis stretching in sitting Other Objective/Functional Measures:  
 
Pain Level (0-10 scale) post treatment: 2 
 
ASSESSMENT/Changes in Function:  
Good tolerance to treatment today with patient req 100% verbal/tactile cueing and demo for proper form/technique with all newly introduced therex. Patient demos (R) post inn rotation today corrected with self-MET Patient will continue to benefit from skilled PT services to modify and progress therapeutic interventions, address functional mobility deficits, address ROM deficits, address strength deficits, analyze and address soft tissue restrictions, analyze and cue movement patterns, analyze and modify body mechanics/ergonomics and assess and modify postural abnormalities to attain remaining goals. [x]  See Plan of Care 
[]  See progress note/recertification 
[]  See Discharge Summary Progress towards goals / Updated goals: 
Short Term Goals: To be accomplished in  2  weeks: 
1)  Establish HEP to prevent further disability. 2)  Patient will report decreased c/o pain to < or = 2avg/10 to facilitate recreational act, and prolonged sitting in class with manageable sx in l/s and buttock. Long Term Goals: To be accomplished in  8-10  treatments: 
1)  Patient to be independent & compliant with HEP in preparation for D/C. 2)  Patient to increase FOTO score to 80 indicating improved functional abilities and QOL. 3)  Patient to increase HS length  to -30deg B to decrease stress to l/s . 4)  Patient to present with normal pelvic alignment for 3 sessions in a row to decrease stress to l/s mm. 5)  Patient able to sit for 3 hrs without l/s increase in sx, to facilitate school work. PLAN [x]  Upgrade activities as tolerated     [x]  Continue plan of care []  Update interventions per flow sheet      
[]  Discharge due to:_ 
[x]  Other: assess response to treatment; discussed possible use of L/S traction if conservative strengthening fails with pt agreeable Noreen Miller, FARZAD 11/7/2018

## 2018-11-09 ENCOUNTER — HOSPITAL ENCOUNTER (OUTPATIENT)
Dept: PHYSICAL THERAPY | Age: 28
Discharge: HOME | End: 2018-11-09
Payer: COMMERCIAL

## 2018-11-09 PROCEDURE — 97110 THERAPEUTIC EXERCISES: CPT

## 2018-11-09 PROCEDURE — 97140 MANUAL THERAPY 1/> REGIONS: CPT

## 2018-11-09 NOTE — PROGRESS NOTES
PT DAILY TREATMENT NOTE  Patient Name: Afshin Starr Date:2018 : 1990 [x]  Patient  Verified Payor: Ashley Tinoco / Plan: [facility] [employer] PPO / Product Type: PPO / In time: 7:32 am               Out time: 8:21 am 
Total Treatment Time (min): 39 Visit #: 3 of  Treatment Area: Acute back pain [M54.9] SUBJECTIVE Pain Level (0-10 scale): 3 Any medication changes, allergies to medications, adverse drug reactions, diagnosis change, or new procedure performed?: [x] No    [] Yes (see summary sheet for update) Subjective functional status/changes:   [x] No changes reported \"I was able to stand for the whole day yesterday. \" Patient arrived 32 minutes late, informed of compliance policy. OBJECTIVEModality rationale: decrease edema, decrease inflammation and decrease pain to improve the patients ability to perform ADLs Min Type Additional Details  
 [] Estim: []Att   []Unatt []IFC  []Premod                                            []NMES    []Other:  []w/ice   []w/heat Position: Location:  
 []  Traction: [] Cervical       [] Lumbar 
                     [] Supine        [] Prone []Intermittent   []Continuous Lbs: 
[] before manual 
[] after manual  
NT / TC []  Ice     []  heat 
[]  Ice massage Position: Location:  
[x] Skin assessment post-treatment:  [x]intact    []redness- no adverse reaction 
                                                                               []redness  adverse reaction:  
 
31 min Therapeutic Exercise:  [x] See flow sheet: added deadlifts and planks Rationale: increase ROM, increase strength and improve coordination to improve the patients ability to perform ADLs, return to gym workouts 8 min Manual Therapy:  (R) post inn rotation corrected with self-MET x 1 attempt; recheck - symmetrical - min (R) post inn rotation corrected with MET for shot gun with cavitation noted bilaterally Rationale: decrease pain, increase ROM, decrease trigger points and improve pelvic mobility to improve the patient's ability to amb with normalized gait pattern 
       
with TE min Patient Education: [x] Review HEP; patient OK to perform bilateral LE therex at gym aka leg press, deadlifts with good LS form; avoid SLS exercises and lunges Other Objective/Functional Measures: HS length, 90/90 from neutral: (L) 40 deg, (R) 61 deg Core strength: plank - 30 seconds with quad fasciculations Deadlift: L/S sag to initiate lift - pt able to correct with mirror (A) Pain Level (0-10 scale) post treatment: 2 - \"better\" - patient did not rate ASSESSMENT/Changes in Function:  
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 Demos reduced anterior chain strength indicated by difficulty with planks. Significant HS tightness. Patient will continue to benefit from skilled PT services to modify and progress therapeutic interventions, address functional mobility deficits, address ROM deficits, address strength deficits, analyze and address soft tissue restrictions, analyze and cue movement patterns, analyze and modify body mechanics/ergonomics and assess and modify postural abnormalities to attain remaining goals. [x]  See Plan of Care 
[]  See progress note/recertification 
[]  See Discharge Summary Progress towards goals / Updated goals: 
Short Term Goals: To be accomplished in  2  weeks: 
1)  Establish HEP to prevent further disability. 2)  Patient will report decreased c/o pain to < or = 2 avg/10 to facilitate recreational act, and prolonged sitting in class with manageable sx in l/s and buttock. Long Term Goals: To be accomplished in  8-10  treatments: 
1)  Patient to be independent & compliant with HEP in preparation for D/C. 2)  Patient to increase FOTO score to 80 indicating improved functional abilities and QOL. 3)  Patient to increase HS length  to -30deg B to decrease stress to L/S. -Goal slowly progressing; initiated HS stretching in clinic and encouraged inc attempts at home as pt notes already performing at home; (L) 40 deg, (R) 61 deg (11/9/18) 4)  Patient to present with normal pelvic alignment for 3 sessions in a row to decrease stress to l/s mm. -Goal progressing; min (R) post inn rotation (11/9/18) 5)  Patient able to sit for 3 hrs without l/s increase in sx, to facilitate school work. -Goal progressing; pt notes ability to stand all day yesterday without inc in pain > 3/10 (11/9/18) PLAN [x]  Upgrade activities as tolerated     [x]  Continue plan of care 
[]  Update interventions per flow sheet      
[]  Discharge due to:_ 
[x]  Other: assess response to treatment; discussed possible use of L/S traction if conservative strengthening fails with pt agreeable Sintia Ramires, PTA 11/9/2018

## 2019-02-21 NOTE — PROGRESS NOTES
[facility] AT [facility] [address]. [address], [facility] Phone: [phone] Fax [phone] DISCHARGE SUMMARY Patient Name: Damián Osborne : 1990 Treatment/Medical Diagnosis: Acute back pain [M54.9] Referral Source: Makayla Willard MD    
Date of Initial Visit: 10/30/18 Attended Visits: 3 Missed Visits: 3 SUMMARY OF TREATMENT Patient seen for Dx of low back pain. Pt attended IE and 2 FU visits. Treatment consist of therapeutic exercise including ROM, stretching, strengthening, stabilization training, postural ed, patient education, HEP instruction, MHP, and Manual therapy. CURRENT STATUS Minimal progress was achieved as patient attended only 2 treatment sessions. Pt did not return for future FU sessions and could not be reached by phone. Progress towards goals / Updated goals: 
Short Term Goals: To be accomplished in  2  weeks: 
1)  Establish HEP to prevent further disability.   
2)  Patient will report decreased c/o pain to < or = 2 avg/10 to facilitate recreational act, and prolonged sitting in class with manageable sx in l/s and buttock.  Pain remains at 3/10 Long Term Goals: To be accomplished in  8-10  treatments: 
1)  Patient to be independent & compliant with HEP in preparation for D/C. 2)  Patient to increase FOTO score to 80 indicating improved functional abilities and QOL. 3)  Patient to increase HS length  to -30deg B to decrease stress to L/S. -Goal slowly progressing; initiated HS stretching in clinic and encouraged inc attempts at home as pt notes already performing at home; (L) 40 deg, (R) 61 deg (18) 4)  Patient to present with normal pelvic alignment for 3 sessions in a row to decrease stress to l/s mm. -Goal progressing; min (R) post inn rotation (18) 5)  Patient able to sit for 3 hrs without l/s increase in sx, to facilitate school work.  -Goal progressing; pt notes ability to stand all day yesterday without inc in pain > 3/10 (11/9/18) RECOMMENDATIONS Discontinue therapy due to lack of attendance or compliance. If you have any questions/comments please contact us directly at [phone]. Thank you for allowing us to assist in the care of your patient. Therapist Signature: Enrico Cosme, PT Date: 2/21/19 Reporting Period: na Time: 8:43 AM

## 2019-07-27 ENCOUNTER — OFFICE VISIT (OUTPATIENT)
Dept: INTERNAL MEDICINE CLINIC | Age: 29
End: 2019-07-27

## 2019-07-27 VITALS
RESPIRATION RATE: 18 BRPM | TEMPERATURE: 99.6 F | BODY MASS INDEX: 25.87 KG/M2 | SYSTOLIC BLOOD PRESSURE: 162 MMHG | DIASTOLIC BLOOD PRESSURE: 104 MMHG | OXYGEN SATURATION: 95 % | HEIGHT: 72 IN | HEART RATE: 114 BPM | WEIGHT: 191 LBS

## 2019-07-27 DIAGNOSIS — R21 RASH: ICD-10-CM

## 2019-07-27 DIAGNOSIS — R03.0 ELEVATED BLOOD-PRESSURE READING WITHOUT DIAGNOSIS OF HYPERTENSION: ICD-10-CM

## 2019-07-27 DIAGNOSIS — M62.830 BACK SPASM: Primary | ICD-10-CM

## 2019-07-27 RX ORDER — MELOXICAM 15 MG/1
15 TABLET ORAL DAILY
Qty: 30 TAB | Refills: 1 | Status: SHIPPED | OUTPATIENT
Start: 2019-07-27

## 2019-07-27 RX ORDER — CYCLOBENZAPRINE HCL 10 MG
10 TABLET ORAL
Qty: 7 TAB | Refills: 0 | Status: SHIPPED | OUTPATIENT
Start: 2019-07-27

## 2019-07-27 RX ORDER — VALACYCLOVIR HYDROCHLORIDE 1 G/1
1000 TABLET, FILM COATED ORAL 3 TIMES DAILY
Qty: 21 TAB | Refills: 0 | Status: SHIPPED | OUTPATIENT
Start: 2019-07-27 | End: 2019-08-03

## 2019-07-27 NOTE — PROGRESS NOTES
Rm: 2    Chief Complaint   Patient presents with    Rash     pt also states that he has burning sensation in shoulder area that radiates to right arm     Depression Screening:  3 most recent PHQ Screens 2/12/2018 4/20/2017 12/1/2016 10/26/2016   Little interest or pleasure in doing things Not at all Not at all Not at all Not at all   Feeling down, depressed, irritable, or hopeless Not at all Not at all Not at all Not at all   Total Score PHQ 2 0 0 0 0       Learning Assessment:  Learning Assessment 10/26/2016   PRIMARY LEARNER Patient   HIGHEST LEVEL OF EDUCATION - PRIMARY LEARNER  > 4 YEARS OF COLLEGE   BARRIERS PRIMARY LEARNER NONE   CO-LEARNER CAREGIVER No   PRIMARY LANGUAGE ENGLISH   LEARNER PREFERENCE PRIMARY READING   ANSWERED BY patient   RELATIONSHIP SELF       Abuse Screening:  No flowsheet data found. Health Maintenance reviewed and discussed per provider: yes     Coordination of Care:    1. Have you been to the ER, urgent care clinic since your last visit? Hospitalized since your last visit? no    2. Have you seen or consulted any other health care providers outside of the [address] [place] [address] since your last visit? Include any pap smears or colon screening.  no

## 2019-07-27 NOTE — PROGRESS NOTES
SUBJECTIVE:   HPI:  Mary Ellen Gross is a 34 y.o. male who complains of 2-3 days of burning back pain that radiates to right arm/chest.  Reports earlier in the week was working out. Thought initially pulled muscle. Now concerned for shingles with burning pain in back and in arm. Did have chicken pox as a child. Reports now having a small rash develop on his inner right upper arm but no pain/burning/itching. 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 ROS:    · General: No fever, chills; no weight weight loss, no night sweats  · Respiratory: No cough, No shortness of breath, No wheezing  · Cardiovascular: No chest pain, No palpitations, No dyspnea on exertion  · Gastrointestinal: No nausea, no vomiting, no diarrhea, no constipation, no black or bloody stools  · Urinary: No dysuria, no urgency, no frequency, no blood, no discharge  · Musculoskeletal: no muscle weakness, no muscles pain  · Neurological: No dizziness, no headaches, no numbness/tingling of extremities    Past Medical History:   Diagnosis Date    Benign hypertension without CHF     White coat syndrome with hypertension      Past Surgical History:   Procedure Laterality Date    HX HEENT      HX TONSIL AND ADENOIDECTOMY      approx 2005     Outpatient Medications Marked as Taking for the 7/27/19 encounter (Office Visit) with Joanie Prakash,    Medication Sig Dispense Refill    finasteride (PROPECIA) 1 mg tablet Take 1 Tab by mouth daily.  90 Tab 3     Allergies   Allergen Reactions    Sulfa (Sulfonamide Antibiotics) Swelling       OBJECTIVE:  Visit Vitals  BP (!) 162/104 (BP 1 Location: Right arm, BP Patient Position: Sitting)   Pulse (!) 114   Temp 99.6 °F (37.6 °C) (Oral)   Resp 18   Ht 6' (1.829 m)   Wt 191 lb (86.6 kg)   SpO2 95%   BMI 25.90 kg/m²      GEN: awake, alert, orientated, in no acute distress  NECK: No lymphadenopathy, normal appearing thyroid  BACK: No rash/lesions. + Spasm and tenderness to right lower trapezius/rhomboid area - pain worse with neck flexion. EXTREMITIES: full range of motion, no weakness  Right inner arm: small erythematous patch/papules without pus/fluid. NEURO: Cranial nerves II-XII grossly intact, normal balance, normal gait  PSYCH: Normal speech, normal mood, normal affect    ASSESSMENT/PLAN:     1. Back spasm - Suspect back spasm causing most of his symptoms with some radiculopathy. NSAIDs, muscle relaxer, heat/ice therapy and stretching. Return if symptoms worsen or do not improve. - cyclobenzaprine (FLEXERIL) 10 mg tablet; Take 1 Tab by mouth nightly. Indications: muscle spasm  Dispense: 7 Tab; Refill: 0  - meloxicam (MOBIC) 15 mg tablet; Take 1 Tab by mouth daily. Dispense: 30 Tab; Refill: 1    2. Rash - small area on right inner arm that could fit dermatomal pattern. He has had chicken pox as a child so this could be re-activation. Will empirically treat for shingles since within 72 hour window. Return if symptoms worsen or do not improve.  - valACYclovir (VALTREX) 1 gram tablet; Take 1 Tab by mouth three (3) times daily for 7 days. Dispense: 21 Tab; Refill: 0    3. Elevated blood-pressure reading without diagnosis of hypertension - maybe be related to pain or white coat as reports normal BPs at home. Recommend formal ambulatory monitoring to further assess. Also may need sleep study as there is concern for sleep apnea with daytime fatigue - will engage [facility].

## 2019-07-27 NOTE — PATIENT INSTRUCTIONS
Back Spasm: Care Instructions  Your Care Instructions  A back spasm is sudden tightness and pain in your back muscles. It may happen from overuse or an injury. Things like sleeping in an awkward way, bending, lifting, standing, or sitting can sometimes cause a spasm. But the cause isn't always clear. Home treatment includes using heat or ice, taking over-the-counter (OTC) pain medicines, and avoiding activities that may cause back pain. For a back spasm that doesn't get better with home care, your doctor may prescribe medicine. Treatments such as massage or manipulation may also help ease a back spasm. Your doctor may also suggest exercise or physical therapy to help improve strength and flexibility in your back muscles. In most cases, getting back to your normal activities is good for your back. Just make sure to avoid doing things that make your pain worse. Follow-up care is a key part of your treatment and safety. Be sure to make and go to all appointments, and call your doctor if you are having problems. It's also a good idea to know your test results and keep a list of the medicines you take. How can you care for yourself at home?   Heat, ice, and medicines    · To relieve pain, use heat or ice (whichever feels better) on the affected area. ? Put a warm water bottle, a heating pad set on low, or a warm cloth on your back. Put a thin cloth between the heating pad and your skin. Do not go to sleep with a heating pad on your skin. ? Try ice or a cold pack on the area for 10 to 20 minutes at a time. Put a thin cloth between the ice and your skin.     · For most back pain you can take over-the-counter pain medicine. Nonsteroidal anti-inflammatory drugs (NSAIDs) such as ibuprofen or naproxen seem to work best. But if you can't take NSAIDs you can try acetaminophen. Your doctor can prescribe stronger medicines if needed. Be safe with medicines.  Read and follow all instructions on the label.   THE [facility] positions and posture    · Sit or lie in positions that are most comfortable for you and that reduce pain. Try one of these positions when you lie down:  ? Lie on your back with your knees bent and supported by large pillows. ? Lie on the floor with your legs on the seat of a sofa or chair. ? Lie on your side with your knees and hips bent and a pillow between your legs. ? Lie on your stomach if it does not make pain worse.     · Do not sit up in bed. Avoid soft couches and twisted positions.     · Avoid bed rest after the first day of back pain. Bed rest can help relieve pain at first, but it delays healing. Continued rest without activity is usually not good for your back.     · If you must sit for long periods of time, take breaks from sitting. Change positions every 30 minutes. Get up and walk around, or lie in a comfortable position. Activity    · Take short walks several times a day. You can start with 5 to 10 minutes, 3 or 4 times a day, and work up to longer walks. Walk on level surfaces and avoid hills and stairs until your back starts to feel better.     · After your back spasm starts to feel better, try to stretch your muscles every day, especially before and after exercise and at bedtime. Regular stretching can help relax your muscles.     · To prevent future back pain, do exercises to stretch and strengthen your back and stomach. Learn to use good posture, safe lifting techniques, and other ways to move to help you avoid back pain. When should you call for help? Call 911 anytime you think you may need emergency care. For example, call if:    · You are unable to move an arm or a leg at all.   [facility] your doctor now or seek immediate medical care if:    · You have new or worse symptoms in your legs, belly, or buttocks. Symptoms may include:  ? Numbness or tingling. ? Weakness.   ? Pain.     · You lose bladder or bowel control.    Watch closely for changes in your health, and be sure to contact your doctor if:    · You have a fever, lose weight, or don't feel well.     · You do not get better as expected. Where can you learn more? Go to http://julio c-yoanna.info/. Enter E232 in the search box to learn more about \"Back Spasm: Care Instructions. \"  Current as of: September 20, 2018  Content Version: 12.1  © 6841-3000 [employer] Care instructions adapted under license by [employer] (which disclaims liability or warranty for this information). If you have questions about a medical condition or this instruction, always ask your healthcare professional. [place] [address] any warranty or liability for your use of this information.